# Patient Record
Sex: FEMALE | Race: WHITE | Employment: FULL TIME | URBAN - METROPOLITAN AREA
[De-identification: names, ages, dates, MRNs, and addresses within clinical notes are randomized per-mention and may not be internally consistent; named-entity substitution may affect disease eponyms.]

---

## 2010-06-08 LAB — PAP-ABSTRACT: NORMAL

## 2014-02-11 LAB — PAP-ABSTRACT: NORMAL

## 2017-04-13 ENCOUNTER — OFFICE VISIT (OUTPATIENT)
Dept: FAMILY MEDICINE | Facility: CLINIC | Age: 37
End: 2017-04-13
Payer: COMMERCIAL

## 2017-04-13 VITALS
WEIGHT: 126 LBS | HEIGHT: 64 IN | DIASTOLIC BLOOD PRESSURE: 82 MMHG | OXYGEN SATURATION: 98 % | SYSTOLIC BLOOD PRESSURE: 118 MMHG | TEMPERATURE: 98.5 F | BODY MASS INDEX: 21.51 KG/M2

## 2017-04-13 DIAGNOSIS — B35.4 TINEA CORPORIS: ICD-10-CM

## 2017-04-13 DIAGNOSIS — Z00.00 PREVENTATIVE HEALTH CARE: ICD-10-CM

## 2017-04-13 DIAGNOSIS — Z72.0 TOBACCO USE: ICD-10-CM

## 2017-04-13 DIAGNOSIS — K14.6 TONGUE BURNING SENSATION: ICD-10-CM

## 2017-04-13 DIAGNOSIS — D69.6 THROMBOCYTOPENIA, UNSPECIFIED (H): ICD-10-CM

## 2017-04-13 DIAGNOSIS — B37.0 THRUSH: Primary | ICD-10-CM

## 2017-04-13 PROBLEM — R87.610 PAPANICOLAOU SMEAR OF CERVIX WITH ATYPICAL SQUAMOUS CELLS OF UNDETERMINED SIGNIFICANCE (ASC-US): Status: ACTIVE | Noted: 2017-04-13

## 2017-04-13 LAB
KOH PREP SPEC: ABNORMAL
MICRO REPORT STATUS: ABNORMAL
SPECIMEN SOURCE: ABNORMAL

## 2017-04-13 PROCEDURE — 99204 OFFICE O/P NEW MOD 45 MIN: CPT | Performed by: PHYSICIAN ASSISTANT

## 2017-04-13 PROCEDURE — 87210 SMEAR WET MOUNT SALINE/INK: CPT | Performed by: PHYSICIAN ASSISTANT

## 2017-04-13 RX ORDER — NYSTATIN 100000/ML
500000 SUSPENSION, ORAL (FINAL DOSE FORM) ORAL 4 TIMES DAILY
Qty: 280 ML | Refills: 0 | Status: SHIPPED | OUTPATIENT
Start: 2017-04-13 | End: 2017-04-27

## 2017-04-13 RX ORDER — CLOTRIMAZOLE 1 %
CREAM (GRAM) TOPICAL 2 TIMES DAILY
Qty: 24 G | Refills: 1 | Status: SHIPPED | OUTPATIENT
Start: 2017-04-13 | End: 2017-08-15

## 2017-04-13 RX ORDER — CITALOPRAM HYDROBROMIDE 20 MG/1
20 TABLET ORAL DAILY
Refills: 11 | COMMUNITY
Start: 2017-03-14 | End: 2017-08-15

## 2017-04-13 NOTE — PROGRESS NOTES
Results discussed directly with patient while patient was present. Any further details documented in the note.   Sarah Ross PA-C

## 2017-04-13 NOTE — PROGRESS NOTES
"  SUBJECTIVE:                                                    Aye Lazo is a 36 year old female who presents to clinic today for the following health issues:    Tongue Concerns  Aye presents to clinic today with chief complaint of tongue concerns. Onset of symptoms was ~2 weeks. She describes tongue discoloration, discomfort, and \"burning\" sensation on tongue. Also notes some right ear discomfort. Denies recent antibiotic use though she has been suffering from viral cough and sinus symptoms. States that she has been pushing vitamin C to improve symptoms. Has tried cutting back vitamin C use to see if this was causing symptoms as symptoms are aggravated when taking vitamin C throat lozenge without any improvement of symptoms. She denies any other recent diet, medication, or supplement changes. She does have PMH significant for tobacco abuse. Currently smoking 0.5 packs per day though mildly decreased with current oral symptoms.     Problem list and histories reviewed & adjusted, as indicated.  Additional history: as documented    Patient Active Problem List   Diagnosis     Pre-eclampsia     Fetal growth restriction     S/P  section     Tobacco use     ASCUS Pap 2010     Thrombocytopenia, unspecified (H)     Past Surgical History:   Procedure Laterality Date      SECTION N/A 2014    Procedure:  SECTION;  Surgeon: Sobia Patel MD;  Location: [facility]     TYMPANOPLASTY, RT/LT Right        Social History   Substance Use Topics     Smoking status: Current Every Day Smoker     Packs/day: 0.50     Years: 19.00     Smokeless tobacco: Never Used     Alcohol use Yes      Comment: rare     History reviewed. No pertinent family history.      Current Outpatient Prescriptions   Medication Sig Dispense Refill     Cetirizine HCl (ZYRTEC ALLERGY PO)        clotrimazole (LOTRIMIN) 1 % cream Apply topically 2 times daily 24 g 1     nystatin (MYCOSTATIN) 325346 UNIT/ML " "suspension Take 5 mLs (500,000 Units) by mouth 4 times daily for 14 days 280 mL 0     citalopram (CELEXA) 20 MG tablet Take 20 mg by mouth daily  11     Allergies   Allergen Reactions     Azithromycin        Reviewed and updated as needed this visit by clinical staff  Tobacco  Allergies  Meds  Problems  Med Hx  Surg Hx  Fam Hx  Soc Hx        Reviewed and updated as needed this visit by Provider  Tobacco  Allergies  Meds  Problems  Med Hx  Surg Hx  Fam Hx  Soc Hx          ROS:  Constitutional, HEENT, cardiovascular, pulmonary, GI, , musculoskeletal, neuro, skin, endocrine and psych systems are negative, except as otherwise noted.    This document serves as a record of the services and decisions personally performed and made by Sarah Ross PA-C. It was created on her behalf by Galina Ambrose, a trained medical scribe. The creation of this document is based the provider's statements to the medical scribe.  Galina Ambrose, April 13, 2017 11:28 AM    OBJECTIVE:                                                    /82  Temp 98.5  F (36.9  C) (Tympanic)  Ht 5' 4\" (1.626 m)  Wt 126 lb (57.2 kg)  SpO2 98%  BMI 21.63 kg/m2  Body mass index is 21.63 kg/(m^2).     GENERAL: healthy, alert and no distress  HENT: right TM tense, scarred, and with clear effusion though w/o evidence of infection, left canal clear, nose without ulcers or lesions,   NECK: no adenopathy, no asymmetry, masses, or scars and thyroid normal to palpation  RESP: lungs clear to auscultation - no rales, rhonchi or wheezes  CV: regular rate and rhythm, normal S1 S2, no S3 or S4, no murmur, click or rub, no peripheral edema and peripheral pulses strong  SKIN: multiple scatter erythematous tinea with raised borders on lateral aspect of left shin and right forearm  NEURO: Normal strength and tone, mentation intact and speech normal  PSYCH: mentation appears normal, affect normal/bright    Diagnostic Test Results:  Results for orders placed " or performed in visit on 04/13/17 (from the past 24 hour(s))   KOH prep (Other than skin, nails, hair)   Result Value Ref Range    Specimen Description Tongue     KOH Prep Fungal elements seen (A)     Micro Report Status FINAL 04/13/2017         ASSESSMENT/PLAN:                                                    Aye was seen today for other.    Diagnoses and all orders for this visit:    Thrush, Tongue burning sensation - if no improvement may require workup for immunocompromised status  Begin taking oral nystatin as prescribed to improve symptoms.   -     nystatin (MYCOSTATIN) 714136 UNIT/ML suspension; Take 5 mLs (500,000 Units) by mouth 4 times daily for 14 days  -     KOH prep (Other than skin, nails, hair): positive fungal mouth     Tinea corporis  Begin using topical clotrimazole as prescribed over rash to improve symptoms.  -     clotrimazole (LOTRIMIN) 1 % cream; Apply topically 2 times daily    Tobacco use  Counseled patient on the importance of tobacco cessation. Also recommended patient RTC for a physical for preventative health and wellness.  ASCUS Pap in 2010.    The information in this document, created by the medical scribe for me, accurately reflects the services I personally performed and the decisions made by me. I have reviewed and approved this document for accuracy prior to leaving the patient care area .  Sarah Ross PA-C April 13, 2017 11:28 AM    Sarah Ross PA-C  [facility]

## 2017-04-13 NOTE — NURSING NOTE
"Chief Complaint   Patient presents with     Other     tounge issue, spots on tounge ~2 weeks        Initial /82  Temp 98.5  F (36.9  C) (Tympanic)  Ht 5' 4\" (1.626 m)  Wt 126 lb (57.2 kg)  SpO2 98%  BMI 21.63 kg/m2 Estimated body mass index is 21.63 kg/(m^2) as calculated from the following:    Height as of this encounter: 5' 4\" (1.626 m).    Weight as of this encounter: 126 lb (57.2 kg).  Medication Reconciliation: complete   Sobia Troy, DAVID      "

## 2017-04-13 NOTE — PATIENT INSTRUCTIONS
.tcavstine  Fungal Skin Infection (Tinea)  A fungal infection is when too much fungus grows on or in the body. Fungus normally lives on the skin in small amounts and does not cause harm. But when too much grows on the skin, it causes an infection. This is also known as tinea. Fungal skin infections are common and not often serious.  The infection often starts as a small red area the size of a pea. The skin may turn dry and flaky. The area may itch. As the fungus grows, it spreads out in a red [place]. Because of how it looks, fungal skin infection is often called ringworm, but it is not caused by a worm. Fungal skin infections can occur on many parts of the body. They can grow on the head, chest, arms, or legs. They can occur on the buttocks. On the feet, fungal infection is known as  athlete s foot.  It causes itchy, sometimes painful sores between the toes and the bottom or sides of the feet. In the groin, the rash is called  jock itch.   People with weakened immune systems can get a fungal infection more easily. This includes people with diabetes or HIV, or who are being treated for cancer. In these cases, the fungal infection can spread and cause severe illness. Fungal infections are also more common in people who are obese.  In most cases, treatment is done with antifungal cream or ointment. If the infection is on your scalp, you may take oral medication. In some cases, a tiny piece of the skin (biopsy) may be taken. This is so it can be tested in a lab.  Common fungal infections are treated with creams on the skin or oral medicine.  Home care  Follow all instructions when using antifungal cream or ointment on your skin. The health care provider may advise using cornstarch powder to keep your skin dry or petroleum jelly to provide a barrier.  General care:    If you were prescribed an oral medicine, read the patient information. Talk with the health care provider about the risks and side effects.    Let your  skin dry completely after bathing. Carefully dry your feet and between your toes.    Dress in loose cotton clothing.    Don t scratch the affected area. This can delay healing and may spread the infection. It can also cause a bacterial infection.    Keep your skin clean, but don t wash the skin too much. This can irritate your skin.    Keep in mind that it may take a week before the fungus starts to go away. It can take 2 to 4 weeks to fully clear. To prevent it from coming back, use the medicine until the rash is all gone.  Follow-up care  Follow up with your health care provider if the rash does not get better after 10 days of treatment. Also follow up if the rash spreads to other parts of your body.  When to seek medical advice  Call your health care provider right away if any of these occur:    Fever of 100.4 F (38 C) or higher    Redness or swelling that gets worse    Pain that gets worse    Foul-smelling fluid leaking from the skin       5674-6135 [employer]. [address] [place] [address], [place] [address]. All rights reserved. This information is not intended as a substitute for professional medical care. Always follow your healthcare professional's instructions.        Candida Infection: Thrush  Thrush is a type of fungal infection in the mouth and throat. Thrush does not usually affect healthy adults. It is more common in people with a weakened immune system. It is also more likely if you take antibiotics. Thrush is normally not contagious.  Understanding fungus in the mouth and throat  Your mouth and throat normally contain millions of tiny organisms. These include bacteria and yeasts. Many of these do not cause any problems. In fact, they may help fight disease.  Yeasts are a type of fungus. A type of yeast called Candida normally lives on your skin. It is also found on the membranes of your mouth and throat. Usually, this yeast grows only in small amounts and is harmless. But in some cases,  Candida can grow out of control and cause thrush. Thrush is related to other kinds of Candida infections that can grow all over the body. Thrush refers to an infection of only the mouth and throat.  What causes thrush?  Thrush happens when something lets too much Candida grow inside your mouth and throat. Certain things that change the normal balance of organisms in the mouth can lead to thrush. One example is antibiotic medicine. This medicine may kill some of the normal bacteria in your mouth. Candida can then grow freely. People on antibiotics have an increased risk for thrush.  You have a higher risk for thrush if you:    Wear dentures    Are getting chemotherapy    Are getting radiation therapy    Have diabetes    Have a transplanted organ    Use corticosteroids    Have a weakened immune system, such as from AIDS    Are an older adult  Symptoms of thrush  Some people with thrush do not have any symptoms. Symptoms of thrush can include:    A dry, cottony feeling in your mouth    Loss of taste    Pain while eating or swallowing    White or red patches inside your mouth or on the back of your throat  Diagnosing thrush  Your health care provider will ask about your medical history and your symptoms. He or she will look closely at your mouth and throat. White or red patches will be scraped with a tongue depressor. The sample will be sent to a lab to test. This test can usually confirm thrush.  If you have thrush, you may also have esophageal candidiasis. This is common in people who have HIV. Your health care provider may check for this condition with an upper endoscopy. This is a procedure to look at the esophagus. A tissue sample may be taken to test.  Treatment for thrush  It is important to treat thrush early. Untreated, it may turn into a more serious form of widespread infection. Thrush is usually treated with antifungal medicine. The medicine is put directly in your mouth and throat. You may be given a  swish  and swallow  medicine or an antifungal lozenge.  In some cases, you may need an antifungal pill. This can remove Candida throughout your body. Or you may need medicine through an IV. These treatments depend on how severe your infection is, and what other health conditions you have.  If you are at high risk for thrush, you may need to keep taking oral antifungal medicine. This is to help prevent thrush in the future.  What happens if you don t get treated for thrush?  If untreated, the Candida may spread throughout your body. They may even enter your bloodstream. This can cause serious problems, such as organ failure and even death. Bloodstream infection may need to be treated with high doses of antifungal medicine through an IV.  Systemic infection is much more likely in people who are very ill. It is also more common in those who have serious problems with their immune system. Additional risk factors for systemic infection in very ill people include:    Central venous lines    IV nutrition    Broad-spectrum antibiotics    Kidney failure    Recent surgery  Preventing thrush  You may be able to help prevent some cases of thrush. Make sure to:    Practice good oral hygiene. Try using a chlorhexidine mouthwash.    Clean your dentures regularly as instructed. Make sure they fit you correctly.    After using a corticosteroid inhaler, rinse out your mouth with water or mouthwash.    Do not use broad-spectrum antibiotics, if possible.    Get treated for health problems that increase your risk for thrush, such as diabetes.     When to call the health care provider  Call your health care provider right away if you have any of these:    Cottony feeling in your mouth    Loss of taste    Pain while eating or swallowing    White or red patches inside your mouth or on the back of your throat        4818-8636 [employer]. [address] [place] [address], [place] [address]. All rights reserved. This information is not intended  as a substitute for professional medical care. Always follow your healthcare professional's instructions.

## 2017-04-13 NOTE — MR AVS SNAPSHOT
After Visit Summary   4/13/2017    Aye Lazo    MRN: [id number]           Patient Information     Date Of Birth          1980        Visit Information        Provider Department      4/13/2017 11:00 AM Sarah Ross PA-C [facility] [place]        Today's Diagnoses     Thrush    -  1    Tongue burning sensation        Tinea corporis        Tobacco use          Care Instructions    .tcavstine  Fungal Skin Infection (Tinea)  A fungal infection is when too much fungus grows on or in the body. Fungus normally lives on the skin in small amounts and does not cause harm. But when too much grows on the skin, it causes an infection. This is also known as tinea. Fungal skin infections are common and not often serious.  The infection often starts as a small red area the size of a pea. The skin may turn dry and flaky. The area may itch. As the fungus grows, it spreads out in a red [place]. Because of how it looks, fungal skin infection is often called ringworm, but it is not caused by a worm. Fungal skin infections can occur on many parts of the body. They can grow on the head, chest, arms, or legs. They can occur on the buttocks. On the feet, fungal infection is known as  athlete s foot.  It causes itchy, sometimes painful sores between the toes and the bottom or sides of the feet. In the groin, the rash is called  jock itch.   People with weakened immune systems can get a fungal infection more easily. This includes people with diabetes or HIV, or who are being treated for cancer. In these cases, the fungal infection can spread and cause severe illness. Fungal infections are also more common in people who are obese.  In most cases, treatment is done with antifungal cream or ointment. If the infection is on your scalp, you may take oral medication. In some cases, a tiny piece of the skin (biopsy) may be taken. This is so it can be tested in a lab.  Common fungal infections are treated with  creams on the skin or oral medicine.  Home care  Follow all instructions when using antifungal cream or ointment on your skin. The health care provider may advise using cornstarch powder to keep your skin dry or petroleum jelly to provide a barrier.  General care:    If you were prescribed an oral medicine, read the patient information. Talk with the health care provider about the risks and side effects.    Let your skin dry completely after bathing. Carefully dry your feet and between your toes.    Dress in loose cotton clothing.    Don t scratch the affected area. This can delay healing and may spread the infection. It can also cause a bacterial infection.    Keep your skin clean, but don t wash the skin too much. This can irritate your skin.    Keep in mind that it may take a week before the fungus starts to go away. It can take 2 to 4 weeks to fully clear. To prevent it from coming back, use the medicine until the rash is all gone.  Follow-up care  Follow up with your health care provider if the rash does not get better after 10 days of treatment. Also follow up if the rash spreads to other parts of your body.  When to seek medical advice  Call your health care provider right away if any of these occur:    Fever of 100.4 F (38 C) or higher    Redness or swelling that gets worse    Pain that gets worse    Foul-smelling fluid leaking from the skin       8266-8588 [employer]. [address] [place] [address], [place] [id number]. All rights reserved. This information is not intended as a substitute for professional medical care. Always follow your healthcare professional's instructions.        Candida Infection: Thrush  Thrush is a type of fungal infection in the mouth and throat. Thrush does not usually affect healthy adults. It is more common in people with a weakened immune system. It is also more likely if you take antibiotics. Thrush is normally not contagious.  Understanding fungus in the mouth and  throat  Your mouth and throat normally contain millions of tiny organisms. These include bacteria and yeasts. Many of these do not cause any problems. In fact, they may help fight disease.  Yeasts are a type of fungus. A type of yeast called Candida normally lives on your skin. It is also found on the membranes of your mouth and throat. Usually, this yeast grows only in small amounts and is harmless. But in some cases, Candida can grow out of control and cause thrush. Thrush is related to other kinds of Candida infections that can grow all over the body. Thrush refers to an infection of only the mouth and throat.  What causes thrush?  Thrush happens when something lets too much Candida grow inside your mouth and throat. Certain things that change the normal balance of organisms in the mouth can lead to thrush. One example is antibiotic medicine. This medicine may kill some of the normal bacteria in your mouth. Candida can then grow freely. People on antibiotics have an increased risk for thrush.  You have a higher risk for thrush if you:    Wear dentures    Are getting chemotherapy    Are getting radiation therapy    Have diabetes    Have a transplanted organ    Use corticosteroids    Have a weakened immune system, such as from AIDS    Are an older adult  Symptoms of thrush  Some people with thrush do not have any symptoms. Symptoms of thrush can include:    A dry, cottony feeling in your mouth    Loss of taste    Pain while eating or swallowing    White or red patches inside your mouth or on the back of your throat  Diagnosing thrush  Your health care provider will ask about your medical history and your symptoms. He or she will look closely at your mouth and throat. White or red patches will be scraped with a tongue depressor. The sample will be sent to a lab to test. This test can usually confirm thrush.  If you have thrush, you may also have esophageal candidiasis. This is common in people who have HIV. Your  health care provider may check for this condition with an upper endoscopy. This is a procedure to look at the esophagus. A tissue sample may be taken to test.  Treatment for thrush  It is important to treat thrush early. Untreated, it may turn into a more serious form of widespread infection. Thrush is usually treated with antifungal medicine. The medicine is put directly in your mouth and throat. You may be given a  swish and swallow  medicine or an antifungal lozenge.  In some cases, you may need an antifungal pill. This can remove Candida throughout your body. Or you may need medicine through an IV. These treatments depend on how severe your infection is, and what other health conditions you have.  If you are at high risk for thrush, you may need to keep taking oral antifungal medicine. This is to help prevent thrush in the future.  What happens if you don t get treated for thrush?  If untreated, the Candida may spread throughout your body. They may even enter your bloodstream. This can cause serious problems, such as organ failure and even death. Bloodstream infection may need to be treated with high doses of antifungal medicine through an IV.  Systemic infection is much more likely in people who are very ill. It is also more common in those who have serious problems with their immune system. Additional risk factors for systemic infection in very ill people include:    Central venous lines    IV nutrition    Broad-spectrum antibiotics    Kidney failure    Recent surgery  Preventing thrush  You may be able to help prevent some cases of thrush. Make sure to:    Practice good oral hygiene. Try using a chlorhexidine mouthwash.    Clean your dentures regularly as instructed. Make sure they fit you correctly.    After using a corticosteroid inhaler, rinse out your mouth with water or mouthwash.    Do not use broad-spectrum antibiotics, if possible.    Get treated for health problems that increase your risk for thrush,  "such as diabetes.     When to call the health care provider  Call your health care provider right away if you have any of these:    Cottony feeling in your mouth    Loss of taste    Pain while eating or swallowing    White or red patches inside your mouth or on the back of your throat        1743-3222 [employer]. [address] [place] [address]. All rights reserved. This information is not intended as a substitute for professional medical care. Always follow your healthcare professional's instructions.              Follow-ups after your visit        Who to contact     If you have questions or need follow up information about today's clinic visit or your schedule please contact [facility] directly at [phone].  Normal or non-critical lab and imaging results will be communicated to you by MyChart, letter or phone within 4 business days after the clinic has received the results. If you do not hear from us within 7 days, please contact the clinic through [employer] or phone. If you have a critical or abnormal lab result, we will notify you by phone as soon as possible.  Submit refill requests through [employer] or call your pharmacy and they will forward the refill request to us. Please allow 3 business days for your refill to be completed.          Additional Information About Your Visit        ProtoExchangehart Information     clypd lets you send messages to your doctor, view your test results, renew your prescriptions, schedule appointments and more. To sign up, go to [email or web address] . Click on \"Log in\" on the left side of the screen, which will take you to the Welcome page. Then click on \"Sign up Now\" on the right side of the page.     You will be asked to enter the access code listed below, as well as some personal information. Please follow the directions to create your username and password.     Your access code is: [email or web address]  Expires: 7/12/2017 11:53 AM     Your access " "code will  in 90 days. If you need help or a new code, please call your [place] clinic or [phone].        Care EveryWhere ID     This is your Care EveryWhere ID. This could be used by other organizations to access your [place] medical records  [id number]        Your Vitals Were     Temperature Height Pulse Oximetry BMI (Body Mass Index)          98.5  F (36.9  C) (Tympanic) 5' 4\" (1.626 m) 98% 21.63 kg/m2         Blood Pressure from Last 3 Encounters:   17 118/82   14 129/82    Weight from Last 3 Encounters:   17 126 lb (57.2 kg)   14 158 lb 14.4 oz (72.1 kg)              We Performed the Following     KOH prep (Other than skin, nails, hair)          Today's Medication Changes          These changes are accurate as of: 17 11:53 AM.  If you have any questions, ask your nurse or doctor.               Start taking these medicines.        Dose/Directions    clotrimazole 1 % cream   Commonly known as:  LOTRIMIN   Used for:  Tinea corporis   Started by:  Sarah Ross PA-C        Apply topically 2 times daily   Quantity:  24 g   Refills:  1       nystatin 012137 UNIT/ML suspension   Commonly known as:  MYCOSTATIN   Used for:  Thrush   Started by:  Sarah Ross PA-C        Dose:  023515 Units   Take 5 mLs (500,000 Units) by mouth 4 times daily for 14 days   Quantity:  280 mL   Refills:  0            Where to get your medicines      These medications were sent to [facility] [place] - [address] [address]     Phone:  [phone]     clotrimazole 1 % cream    nystatin 307451 UNIT/ML suspension                Primary Care Provider Office Phone # Fax #    Rebecca SAMSON Miraphillip [phone]       [place] [address] [address] [facility] [address]        Thank you!     Thank you for choosing [facility]  for your care. Our goal is always to provide you with " excellent care. Hearing back from our patients is one way we can continue to improve our services. Please take a few minutes to complete the written survey that you may receive in the mail after your visit with us. Thank you!             Your Updated Medication List - Protect others around you: Learn how to safely use, store and throw away your medicines at www.disposemymeds.org.          This list is accurate as of: 4/13/17 11:53 AM.  Always use your most recent med list.                   Brand Name Dispense Instructions for use    citalopram 20 MG tablet    celeXA     Take 20 mg by mouth daily       clotrimazole 1 % cream    LOTRIMIN    24 g    Apply topically 2 times daily       nystatin 514138 UNIT/ML suspension    MYCOSTATIN    280 mL    Take 5 mLs (500,000 Units) by mouth 4 times daily for 14 days       ZYRTEC ALLERGY PO

## 2017-08-14 NOTE — TELEPHONE ENCOUNTER
Reason for Call:  Other prescription refill    Detailed comments: Patient calling for refill of Celexa.    Phone Number Patient can be reached at:     [phone]    Best Time: any    Can we leave a detailed message on this number? YES    Call taken on 8/14/2017 at 9:16 AM by Elen Israel

## 2017-08-15 ENCOUNTER — NURSE TRIAGE (OUTPATIENT)
Dept: NURSING | Facility: CLINIC | Age: 37
End: 2017-08-15

## 2017-08-15 ENCOUNTER — OFFICE VISIT (OUTPATIENT)
Dept: FAMILY MEDICINE | Facility: CLINIC | Age: 37
End: 2017-08-15
Payer: COMMERCIAL

## 2017-08-15 VITALS
DIASTOLIC BLOOD PRESSURE: 77 MMHG | HEART RATE: 100 BPM | HEIGHT: 64 IN | OXYGEN SATURATION: 98 % | WEIGHT: 117 LBS | SYSTOLIC BLOOD PRESSURE: 113 MMHG | TEMPERATURE: 98.1 F | BODY MASS INDEX: 19.97 KG/M2

## 2017-08-15 DIAGNOSIS — L02.91 ABSCESS: Primary | ICD-10-CM

## 2017-08-15 DIAGNOSIS — F32.0 MILD MAJOR DEPRESSION (H): ICD-10-CM

## 2017-08-15 DIAGNOSIS — Z72.0 TOBACCO USE: ICD-10-CM

## 2017-08-15 PROCEDURE — 87077 CULTURE AEROBIC IDENTIFY: CPT | Performed by: PHYSICIAN ASSISTANT

## 2017-08-15 PROCEDURE — 99214 OFFICE O/P EST MOD 30 MIN: CPT | Performed by: PHYSICIAN ASSISTANT

## 2017-08-15 PROCEDURE — 87070 CULTURE OTHR SPECIMN AEROBIC: CPT | Performed by: PHYSICIAN ASSISTANT

## 2017-08-15 RX ORDER — SULFAMETHOXAZOLE/TRIMETHOPRIM 800-160 MG
1 TABLET ORAL 2 TIMES DAILY
Qty: 20 TABLET | Refills: 0 | Status: SHIPPED | OUTPATIENT
Start: 2017-08-15 | End: 2017-09-19

## 2017-08-15 RX ORDER — CITALOPRAM HYDROBROMIDE 20 MG/1
20 TABLET ORAL DAILY
Qty: 60 TABLET | Refills: 11 | Status: SHIPPED | OUTPATIENT
Start: 2017-08-15 | End: 2017-08-15

## 2017-08-15 RX ORDER — CITALOPRAM HYDROBROMIDE 20 MG/1
20 TABLET ORAL DAILY
Qty: 90 TABLET | Refills: 1 | Status: SHIPPED | OUTPATIENT
Start: 2017-08-15

## 2017-08-15 ASSESSMENT — ANXIETY QUESTIONNAIRES
5. BEING SO RESTLESS THAT IT IS HARD TO SIT STILL: NOT AT ALL
3. WORRYING TOO MUCH ABOUT DIFFERENT THINGS: NOT AT ALL
IF YOU CHECKED OFF ANY PROBLEMS ON THIS QUESTIONNAIRE, HOW DIFFICULT HAVE THESE PROBLEMS MADE IT FOR YOU TO DO YOUR WORK, TAKE CARE OF THINGS AT HOME, OR GET ALONG WITH OTHER PEOPLE: NOT DIFFICULT AT ALL
2. NOT BEING ABLE TO STOP OR CONTROL WORRYING: NOT AT ALL
GAD7 TOTAL SCORE: 0
1. FEELING NERVOUS, ANXIOUS, OR ON EDGE: NOT AT ALL
6. BECOMING EASILY ANNOYED OR IRRITABLE: NOT AT ALL
7. FEELING AFRAID AS IF SOMETHING AWFUL MIGHT HAPPEN: NOT AT ALL

## 2017-08-15 ASSESSMENT — PATIENT HEALTH QUESTIONNAIRE - PHQ9
SUM OF ALL RESPONSES TO PHQ QUESTIONS 1-9: 0
5. POOR APPETITE OR OVEREATING: NOT AT ALL

## 2017-08-15 NOTE — NURSING NOTE
"Chief Complaint   Patient presents with     Infection     Bilateral hands - pus - drainge - discomfort - sensitive to the touch - red - swollen - Denies bleeding      Establish Care       Initial /77 (BP Location: Right arm, Patient Position: Chair, Cuff Size: Adult Regular)  Pulse 100  Temp 98.1  F (36.7  C) (Tympanic)  Ht 5' 4\" (1.626 m)  Wt 117 lb (53.1 kg)  LMP 07/05/2017 (Approximate)  SpO2 98%  Breastfeeding? No  BMI 20.08 kg/m2 Estimated body mass index is 20.08 kg/(m^2) as calculated from the following:    Height as of this encounter: 5' 4\" (1.626 m).    Weight as of this encounter: 117 lb (53.1 kg).  Medication Reconciliation: complete     Laverne Guajardo MA     "

## 2017-08-15 NOTE — TELEPHONE ENCOUNTER
citalopram (CELEXA) 20 MG tablet     This is a duplicate refill request.  Please decline and close.

## 2017-08-15 NOTE — PATIENT INSTRUCTIONS
* ABSCESS [Antibiotic treatment only]  An abscess (sometimes called a  boil ) occurs when bacteria get trapped under the skin and begin to grow. Pus forms inside the abscess as the body responds to the bacteria. An abscess can occur with an insect bite, ingrown hair, blocked oil gland, pimple, cyst, or puncture wound.  In the early stages, redness and tenderness are the only symptoms. Sometimes, this stage can be treated with antibiotics alone. If the abscess does not respond to antibiotic treatment, it will need to be drained with a small cut, under local anesthesia.  HOME CARE:    Soak the wound in hot water or apply hot packs (small towel soaked in hot water) to the area for 20 minutes at a time. Do this three to four times a day.    Apply antibiotic cream or ointment such as Bacitracin or Polysporin onto the skin 3-4 times a day, unless something else was prescribed.  Neosporin Plus  includes an antibiotic plus a local pain reliever.    Take all of the antibiotics until they are gone.    You may use acetaminophen (Tylenol) or ibuprofen (Motrin, Advil) to control pain, unless another pain medicine was prescribed. [ NOTE : If you have chronic liver or kidney disease or ever had a stomach ulcer or GI bleeding, talk with your doctor before using these any of these.]  FOLLOW UP as advised by our staff. Look at your wound each day for the signs of worsening infection listed below.  GET PROMPT MEDICAL ATTENTION if any of the following occur:    An increase in redness or swelling    Red streaks in the skin leading away from the abscess    An increase in local pain or swelling    Fever of 100.4 F (38 C) or higher, or as directed by your healthcare provider    Pus or fluid coming from the abscess    1249-6963 [employer], [address] [place] [address], [place] [address]. All rights reserved. This information is not intended as a substitute for professional medical care. Always follow your healthcare professional's  instructions.

## 2017-08-15 NOTE — MR AVS SNAPSHOT
After Visit Summary   8/15/2017    Aye Lazo    MRN: [id number]           Patient Information     Date Of Birth          1980        Visit Information        Provider Department      8/15/2017 9:00 AM Sarah Ross PA-C [facility] [place]        Today's Diagnoses     Mild major depression (H)    -  1    Abscess        Tobacco use          Care Instructions      * ABSCESS [Antibiotic treatment only]  An abscess (sometimes called a  boil ) occurs when bacteria get trapped under the skin and begin to grow. Pus forms inside the abscess as the body responds to the bacteria. An abscess can occur with an insect bite, ingrown hair, blocked oil gland, pimple, cyst, or puncture wound.  In the early stages, redness and tenderness are the only symptoms. Sometimes, this stage can be treated with antibiotics alone. If the abscess does not respond to antibiotic treatment, it will need to be drained with a small cut, under local anesthesia.  HOME CARE:    Soak the wound in hot water or apply hot packs (small towel soaked in hot water) to the area for 20 minutes at a time. Do this three to four times a day.    Apply antibiotic cream or ointment such as Bacitracin or Polysporin onto the skin 3-4 times a day, unless something else was prescribed.  Neosporin Plus  includes an antibiotic plus a local pain reliever.    Take all of the antibiotics until they are gone.    You may use acetaminophen (Tylenol) or ibuprofen (Motrin, Advil) to control pain, unless another pain medicine was prescribed. [ NOTE : If you have chronic liver or kidney disease or ever had a stomach ulcer or GI bleeding, talk with your doctor before using these any of these.]  FOLLOW UP as advised by our staff. Look at your wound each day for the signs of worsening infection listed below.  GET PROMPT MEDICAL ATTENTION if any of the following occur:    An increase in redness or swelling    Red streaks in the skin leading away  "from the abscess    An increase in local pain or swelling    Fever of 100.4 F (38 C) or higher, or as directed by your healthcare provider    Pus or fluid coming from the abscess    4677-0864 [employer] [facility], [address] [place] [address], [place] [address]. All rights reserved. This information is not intended as a substitute for professional medical care. Always follow your healthcare professional's instructions.            Follow-ups after your visit        Who to contact     If you have questions or need follow up information about today's clinic visit or your schedule please contact [facility] directly at [phone].  Normal or non-critical lab and imaging results will be communicated to you by DeliveryEdgehart, letter or phone within 4 business days after the clinic has received the results. If you do not hear from us within 7 days, please contact the clinic through [employer]Edgehart or phone. If you have a critical or abnormal lab result, we will notify you by phone as soon as possible.  Submit refill requests through [employer] or call your pharmacy and they will forward the refill request to us. Please allow 3 business days for your refill to be completed.          Additional Information About Your Visit        MyChart Information     [employer] lets you send messages to your doctor, view your test results, renew your prescriptions, schedule appointments and more. To sign up, go to [email or web address] . Click on \"Log in\" on the left side of the screen, which will take you to the Welcome page. Then click on \"Sign up Now\" on the right side of the page.     You will be asked to enter the access code listed below, as well as some personal information. Please follow the directions to create your username and password.     Your access code is: [email or web address]  Expires: 2017  9:37 AM     Your access code will  in 90 days. If you need help or a new code, please call your [facility] or [phone].        Care " "EveryWhere ID     This is your Care EveryWhere ID. This could be used by other organizations to access your [place] medical records  [id number]        Your Vitals Were     Pulse Temperature Height Last Period Pulse Oximetry Breastfeeding?    100 98.1  F (36.7  C) (Tympanic) 5' 4\" (1.626 m) 07/05/2017 (Approximate) 98% No    BMI (Body Mass Index)                   20.08 kg/m2            Blood Pressure from Last 3 Encounters:   08/15/17 113/77   04/13/17 118/82   12/22/14 129/82    Weight from Last 3 Encounters:   08/15/17 117 lb (53.1 kg)   04/13/17 126 lb (57.2 kg)   12/22/14 158 lb 14.4 oz (72.1 kg)              We Performed the Following     Wound Culture Aerobic Bacterial          Today's Medication Changes          These changes are accurate as of: 8/15/17  9:37 AM.  If you have any questions, ask your nurse or doctor.               Start taking these medicines.        Dose/Directions    citalopram 20 MG tablet   Commonly known as:  celeXA   Used for:  Mild major depression (H)   Started by:  Sarah Ross PA-C        Dose:  20 mg   Take 1 tablet (20 mg) by mouth daily   Quantity:  90 tablet   Refills:  1       sulfamethoxazole-trimethoprim 800-160 MG per tablet   Commonly known as:  BACTRIM DS   Used for:  Abscess   Started by:  Sarah Ross PA-C        Dose:  1 tablet   Take 1 tablet by mouth 2 times daily for 10 days   Quantity:  20 tablet   Refills:  0            Where to get your medicines      These medications were sent to [facility] [address] [address]     Phone:  [phone]     citalopram 20 MG tablet    sulfamethoxazole-trimethoprim 800-160 MG per tablet                Primary Care Provider Office Phone # Fax #    Rebecca SAMSON Miraphillip [phone]       [place] [address] [address] [facility] [address]        Equal Access to Services     AMANDA WEBSTER AH: Santoii alessio renee " deric Polanco, wakatiuskada lumarciaadaha, qaeribertota kaavada ignacia, leonard espitia keshagladys edmundosmith lavineetclarisa hardy. So [facility] [phone].    ATENCIÓN: Si habla español, tiene a prakash disposición servicios gratuitos de asistencia lingüística. Sky al [phone].    We comply with applicable federal civil rights laws and [place] laws. We do not discriminate on the basis of race, color, national origin, age, disability sex, sexual orientation or gender identity.            Thank you!     Thank you for choosing [facility]  for your care. Our goal is always to provide you with excellent care. Hearing back from our patients is one way we can continue to improve our services. Please take a few minutes to complete the written survey that you may receive in the mail after your visit with us. Thank you!             Your Updated Medication List - Protect others around you: Learn how to safely use, store and throw away your medicines at www.disposemymeds.org.          This list is accurate as of: 8/15/17  9:37 AM.  Always use your most recent med list.                   Brand Name Dispense Instructions for use Diagnosis    citalopram 20 MG tablet    celeXA    90 tablet    Take 1 tablet (20 mg) by mouth daily    Mild major depression (H)       sulfamethoxazole-trimethoprim 800-160 MG per tablet    BACTRIM DS    20 tablet    Take 1 tablet by mouth 2 times daily for 10 days    Abscess       ZYRTEC ALLERGY PO

## 2017-08-15 NOTE — PROGRESS NOTES
SUBJECTIVE:                                                    Aye Lazo is a 36 year old female who presents to clinic today for the following health issues:    Hand lesions  Aye presents to clinic today for evaluation of several lesions on bilateral hands. 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 There are several lesions on the dorsal aspect of her hands. She states she has drained one lesion and would like all of abscesses drained today. There is discomfort and pain with redness and swelling but no bleeding. She has been soaking her hands in salt water with no relief. No known insect bites. No recent antibiotic use. No fevers, chills, or night sweats.     Depression  She has a history of depression that is currently managed on citalopram 20 mg daily. She requests a refill today. She has been on this medication since she was 17.     Right ear concerns  She is status post right ear reconstruction but states it has been popping lately and would like it examined today.     Problem list and histories reviewed & adjusted, as indicated.  Additional history: as documented    Patient Active Problem List   Diagnosis     Pre-eclampsia     Fetal growth restriction     S/P  section     Tobacco use     ASCUS of cervix with negative high risk HPV     Thrombocytopenia, unspecified (H)     Past Surgical History:   Procedure Laterality Date      SECTION N/A 2014    Procedure:  SECTION;  Surgeon: Sobia Patel MD;  Location: [facility]     TYMPANOPLASTY, RT/LT Right        Social History   Substance Use Topics     Smoking status: Current Every Day Smoker     Packs/day: 0.50     Years: 19.00     Smokeless tobacco: Never Used     Alcohol use Yes      Comment: rare     History reviewed. No pertinent family history.      Current Outpatient Prescriptions   Medication Sig Dispense Refill     sulfamethoxazole-trimethoprim (BACTRIM DS)  "800-160 MG per tablet Take 1 tablet by mouth 2 times daily for 10 days 20 tablet 0     citalopram (CELEXA) 20 MG tablet Take 1 tablet (20 mg) by mouth daily 90 tablet 1     Cetirizine HCl (ZYRTEC ALLERGY PO)        [DISCONTINUED] citalopram (CELEXA) 20 MG tablet Take 1 tablet (20 mg) by mouth daily 60 tablet 11     [DISCONTINUED] citalopram (CELEXA) 20 MG tablet Take 20 mg by mouth daily  11     Allergies   Allergen Reactions     Azithromycin          Reviewed and updated as needed this visit by clinical staff  Tobacco  Allergies  Meds  Problems  Med Hx  Surg Hx  Fam Hx  Soc Hx        Reviewed and updated as needed this visit by Provider  Tobacco  Allergies  Meds  Problems  Med Hx  Surg Hx  Fam Hx  Soc Hx          ROS:  Constitutional, HEENT, cardiovascular, pulmonary, GI, , musculoskeletal, neuro, skin, endocrine and psych systems are negative, except as otherwise noted.    This document serves as a record of the services and decisions personally performed and made by Sarah Ross PA-C. It was created on her behalf by Claudia Guajardo, a trained medical scribe. The creation of this document is based the provider's statements to the medical scribe.  Scribe Claudia Guajardo 9:26 AM, August 15, 2017    OBJECTIVE:   /77 (BP Location: Right arm, Patient Position: Chair, Cuff Size: Adult Regular)  Pulse 100  Temp 98.1  F (36.7  C) (Tympanic)  Ht 5' 4\" (1.626 m)  Wt 117 lb (53.1 kg)  LMP 07/05/2017 (Approximate)  SpO2 98%  Breastfeeding? No  BMI 20.08 kg/m2  Body mass index is 20.08 kg/(m^2).  GENERAL: healthy, alert and no distress  HENT: Right ear canal normal, T s/p reconstruction with clear effusion and mild erythema. MS: Multiple pustular lesions on the dorsal aspect of right hand with active drainage overlying the MCP joint of the 3rd finger.     Diagnostic Test Results:  Pending wound culture.     ASSESSMENT/PLAN:   Aye was seen today for infection, establish care and patient " request.    Diagnoses and all orders for this visit:    Abscess  Appears to be consistent with bacterial infection on exam. Because I do have concerns for MRSA given her occupation as a , this will be treated with Bactrim. Advised to wear fingerless/workout gloves while working to avoid further contact with small mailboxes. She should otherwise leave these lesions open and not covered. I also recommended warm soaks with dish soap.  -     Wound Culture Aerobic Bacterial  -     sulfamethoxazole-trimethoprim (BACTRIM DS) 800-160 MG per tablet; Take 1 tablet by mouth 2 times daily for 10 days    Mild major depression (H)  Stable. Refill provided per patient request. Continue current medication without any change.   -     citalopram (CELEXA) 20 MG tablet; Take 1 tablet (20 mg) by mouth daily    Tobacco use  19 year smoking history of 0.5 ppd. Continues to smoke daily. Advised cessation.     The information in this document, created by the medical scribe for me, accurately reflects the services I personally performed and the decisions made by me. I have reviewed and approved this document for accuracy prior to leaving the patient care area.  9:26 AM, 08/15/17    Sarah Ross PA-C  [facility]

## 2017-08-16 ENCOUNTER — TELEPHONE (OUTPATIENT)
Dept: FAMILY MEDICINE | Facility: CLINIC | Age: 37
End: 2017-08-16

## 2017-08-16 RX ORDER — CITALOPRAM HYDROBROMIDE 20 MG/1
20 TABLET ORAL DAILY
Qty: 60 TABLET | Refills: 11 | OUTPATIENT
Start: 2017-08-16

## 2017-08-16 ASSESSMENT — ANXIETY QUESTIONNAIRES: GAD7 TOTAL SCORE: 0

## 2017-08-16 NOTE — TELEPHONE ENCOUNTER
Pt calling back. Having diarrhea. Leg or calf cramping when standing or walking. Tingly in calf area.     Pt has been sleeping sleeping so much due to experiences from yesterday. Pt is using warm soap and water and also the bactroban cream.    [phone].    Huddle with LP, advised this could just be flushing through their system and to keep pushing fluids.    The patient indicates understanding of these issues and agrees with the plan.  Nely Wallace RN

## 2017-08-16 NOTE — TELEPHONE ENCOUNTER
Reason for Disposition    [1] Weakness (i.e., paralysis, loss of muscle strength) of the face, arm / hand, or leg / foot on one side of the body AND [2] sudden onset AND [3] present now    Additional Information    Negative: Severe difficulty breathing (e.g., struggling for each breath, speaks in single words)    Negative: Shock suspected (e.g., cold/pale/clammy skin, too weak to stand, low BP, rapid pulse)    Negative: Difficult to awaken or acting confused  (e.g., disoriented, slurred speech)    Negative: [1] Fainted > 15 minutes ago AND [2] still feels too weak or dizzy to stand    Negative: [1] SEVERE weakness (i.e., unable to walk or barely able to walk, requires support) AND     [2] new onset or worsening    Negative: Sounds like a life-threatening emergency to the triager    Weakness of the face, arm or leg on one side of the body    Negative: [1] SEVERE weakness (i.e., unable to walk or barely able to walk, requires support) AND [2] new onset or worsening    Protocols used: NEUROLOGIC DEFICIT-ADULT-AH, WEAKNESS (GENERALIZED) AND FATIGUE-ADULT-AH   Aye calls and says that she was started today, on Bactrim DS, for all of the sores on her knuckles. Pt. Says that 1 hour after she took the Bactrim DS, she became so exhausted, dizzy, and confused. Pt. Says that she now feels very emotional and fatigued. Pt. Says that her hands hurt worse, too. Pt. Refuses to call 911. Pt. Says that she will have her  take her now, to an ER.

## 2017-08-16 NOTE — TELEPHONE ENCOUNTER
Patient was Rx'd with Sulfa yesterday for abscesses.   Within one hour of taking it she was extremely exhausted, confused, unable to make a sentence and her face, chest and sclera were red -she went to [facility] ED. Today she has diarrhea.    ED Dr said it was a medication reaction to Sulfa.    Gave her IV and benadryl. She is doing better today but she did talk on and on about the experience and I empathized with her. He Rx'd an antibiotic ointment 2% topical Bactroban -she has not filled this yet.  Her abscesses are looking better today.   LP-We are waiting for lab cultures.  Should she wait to fill Bactroban?  Also can you double check the banner- I put in the sulfa allergy but I would like you to double check and make sure it is in correctly.     Priscilla Little RN- Triage [employer]

## 2017-08-16 NOTE — TELEPHONE ENCOUNTER
Casandra Wallace contacted Aye on 08/16/17 and left a message. If patient calls back please contact RN team.  Nely Wallace RN

## 2017-08-17 LAB
BACTERIA SPEC CULT: ABNORMAL
Lab: ABNORMAL
SPECIMEN SOURCE: ABNORMAL

## 2017-08-22 ENCOUNTER — TELEPHONE (OUTPATIENT)
Dept: FAMILY MEDICINE | Facility: CLINIC | Age: 37
End: 2017-08-22
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
## 2017-09-19 DIAGNOSIS — Z53.9 ERRONEOUS ENCOUNTER--DISREGARD: Primary | ICD-10-CM

## 2017-09-19 RX ORDER — MUPIROCIN 20 MG/G
OINTMENT TOPICAL
COMMUNITY
Start: 2017-08-16 | End: 2019-03-16

## 2017-09-19 NOTE — TELEPHONE ENCOUNTER
Called and spoke with patient regarding her reaction to the sulfa medication.  In discussing her history I was aware of the modestly low platelet level in 2014 which is common in the postpartum period, we did not repeat a blood count that day as she did not appear toxic.  I chose bactrim because I was highly suspicious of MRSA due to the appearance of the lesions and I chose the oral regimen because the lesions were widespread on both hands.  The only other alternative for MRSA is very costly and she had never had a reaction to Bactrim in the past.      The patient voiced her frustration over the choice of this antibiotic as opposed to an oral regimen.  She did not allow me explain my rationale as mentioned above.      When she was seen in the ER her platelet levels were normal at 179 and her WBC was elevated at 12.4 indicative of a probable infection.    She was not aware of the above lab results and in reviewing [place] records from 2014 she has declined having the platelets evaluated as they informed her that it could cause more tests to be ordered.    I apologized for her experience as it sounded to be terribly frightening.  She then hung up on me.        Sarah Ross, MS, PA-C

## 2018-03-04 ENCOUNTER — HEALTH MAINTENANCE LETTER (OUTPATIENT)
Age: 38
End: 2018-03-04

## 2019-03-16 ENCOUNTER — ANCILLARY PROCEDURE (OUTPATIENT)
Dept: GENERAL RADIOLOGY | Facility: CLINIC | Age: 39
End: 2019-03-16
Attending: FAMILY MEDICINE
Payer: OTHER MISCELLANEOUS

## 2019-03-16 ENCOUNTER — OFFICE VISIT (OUTPATIENT)
Dept: FAMILY MEDICINE | Facility: CLINIC | Age: 39
End: 2019-03-16
Payer: OTHER MISCELLANEOUS

## 2019-03-16 ENCOUNTER — NURSE TRIAGE (OUTPATIENT)
Dept: NURSING | Facility: CLINIC | Age: 39
End: 2019-03-16

## 2019-03-16 VITALS
TEMPERATURE: 98 F | DIASTOLIC BLOOD PRESSURE: 82 MMHG | HEIGHT: 64 IN | WEIGHT: 122 LBS | BODY MASS INDEX: 20.83 KG/M2 | OXYGEN SATURATION: 100 % | SYSTOLIC BLOOD PRESSURE: 124 MMHG | HEART RATE: 98 BPM

## 2019-03-16 DIAGNOSIS — M54.2 NECK PAIN: Primary | ICD-10-CM

## 2019-03-16 DIAGNOSIS — M54.2 NECK PAIN: ICD-10-CM

## 2019-03-16 DIAGNOSIS — M54.6 ACUTE BILATERAL THORACIC BACK PAIN: ICD-10-CM

## 2019-03-16 PROCEDURE — 72040 X-RAY EXAM NECK SPINE 2-3 VW: CPT | Mod: FY

## 2019-03-16 PROCEDURE — 72070 X-RAY EXAM THORAC SPINE 2VWS: CPT | Mod: FY

## 2019-03-16 PROCEDURE — 99213 OFFICE O/P EST LOW 20 MIN: CPT | Performed by: FAMILY MEDICINE

## 2019-03-16 RX ORDER — IBUPROFEN 200 MG
600 TABLET ORAL EVERY 8 HOURS PRN
Qty: 30 TABLET | Refills: 1 | COMMUNITY
Start: 2019-03-16 | End: 2019-03-28

## 2019-03-16 RX ORDER — CYCLOBENZAPRINE HCL 5 MG
5-10 TABLET ORAL 3 TIMES DAILY PRN
Qty: 20 TABLET | Refills: 1 | Status: SHIPPED | OUTPATIENT
Start: 2019-03-16 | End: 2019-03-28 | Stop reason: ALTCHOICE

## 2019-03-16 ASSESSMENT — MIFFLIN-ST. JEOR: SCORE: 1213.39

## 2019-03-16 NOTE — PATIENT INSTRUCTIONS
Patient Education     Neck Clock (Flexibility)    1. Lie on your back on the floor, with your knees bent and your feet flat on the floor. Place a rolled-up towel or neck roll under your neck.  2. Close your eyes and imagine a clock face. With your nose, slowly trace the outer edge of the clock in a clockwise direction. Move your neck smoothly. Don t force your head or neck.  3. Repeat 5 times, or as instructed.  4. Then switch to a counterclockwise direction, and repeat the exercise 5 times, or as instructed.     Challenge yourself  You can also do this exercise while sitting at your work desk. Sit up straight with your back supported firmly against your chair. You can do the exercise several times throughout your day.   Date Last Reviewed: 3/10/2016    9381-4304 [employer]. [address] [place] [id number]. All rights reserved. This information is not intended as a substitute for professional medical care. Always follow your healthcare professional's instructions.

## 2019-03-16 NOTE — TELEPHONE ENCOUNTER
Patient reports she slipped on ice, is currently at work and neck is hurting.  Current pain is 7/10.  Patient requests appointment today and is seen at [facility].  FNA advised to call back with any concerns.  Call transferred to  per patient's request.        Additional Information    Negative: Dangerous mechanism of injury (e.g., MVA, contact sports, diving, fall on trampoline, fall > 10 feet or 3 meters) (Exception: neck pain began > 1 hour after injury)    Negative: Weakness of arms or legs    Negative: Numbness, tingling, or burning of arms, upper back/chest or legs (Exception: brief, now gone)    Negative: Major bleeding (e.g., actively dripping or spurting)    Negative: Bullet wound, knife wound, or other penetrating object    Negative: Difficulty breathing (e.g., choking or stridor)    Negative: Knocked out (unconscious from head injury) > 1 minute    Negative: [1] Direct blow to front of neck AND [2] cough, hoarseness, abnormal voice, or can't talk    Negative: Sounds like a life-threatening emergency to the triager    Negative: [1] Injuries at more than 1 site AND [2] unsure which guideline to use    Negative: Neck pain not from an injury    Negative: [1] Dangerous mechanism of injury (e.g., MVA, contact sports, diving,  fall on trampoline, fall > 10 feet or 3 meters) AND [2] neck pain or stiffness began > 1 hour after injury    Negative: [1] Numbness, tingling, or burning of arms, upper back/chest or legs AND [2] not present now (i.e., completely resolved)    Negative: Coughing up blood    Negative: Difficulty swallowing or drooling    Negative: Skin is split open or gaping  (or length > 1/2 inch or 12 mm)    Negative: Puncture wound of neck    Negative: [1] Bleeding AND [2] won't stop after 10 minutes of direct pressure (using correct technique)    Negative: Large swelling or bruise > 2 inches (5 cm)    Negative: Sounds like a serious injury to the triager    Negative: SEVERE neck pain (e.g.,  excruciating)    Negative: Suspicious history for the injury    Negative: Patient is confused or is an unreliable provider of information (e.g., dementia, profound mental retardation, alcohol intoxication)    Negative: High-risk adult (e.g., rheumatoid arthritis, ankylosing spondylitis, cervical spine abnormalities)    Negative: [1] Last tetanus shot > 5 years ago AND [2] DIRTY cut or scrape    Negative: [1] After 3 days (72 hours) AND [2] neck pain not improving    Negative: [1] Neck pain or swelling AND [2] present > 7 days    Neck swelling, bruise or pain from direct blow to the neck    Protocols used: NECK INJURY-ADULT-

## 2019-03-16 NOTE — LETTER
REPORT OF WORK COMP  Employee Name: Aye Lazo      : 1980     Work related injury: Yes  Employer at time of injury: [employer]  Employed elsewhere? Yes    Today's date: 2019    Date of injury: 19   Date of first visit: 19     1. Diagnosis:       ICD-10-CM    1. Neck pain M54.2 XR Cervical Spine 2/3 Views     cyclobenzaprine (FLEXERIL) 5 MG tablet     ibuprofen (ADVIL/MOTRIN) 200 MG tablet   2. Acute bilateral thoracic back pain M54.6 XR Thoracic Spine 2 Views     ibuprofen (ADVIL/MOTRIN) 200 MG tablet        2. Treatment:  rest, gentle range of motion exercises, ice  3. Medication: *cyclobenazaprine, ibuprofen   4. No work from 3/16/19 thorugh to 3/21/19.  5. Return to work date: TBD     Maximum Medical Improvement (Date): TBD  Any Permanent Partial Disability? TBD%    Provider comments:         Medical Examiner: Artem Archibald MD           License or registration: [id number]    Next appointment: Date - 3/21/19    CC: Employer, Managed Care Plan/Payor, Patient
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
## 2019-03-18 ENCOUNTER — TELEPHONE (OUTPATIENT)
Dept: FAMILY MEDICINE | Facility: CLINIC | Age: 39
End: 2019-03-18

## 2019-03-18 NOTE — TELEPHONE ENCOUNTER
Called patient @ [phone]    Patient stated she is not having any new symptoms, the symptoms she has been having is just worsening.     DENIES: CP, SOB, Difficulty Breathing, Dizziness/Lightheadedness, Numbness/Tingling, HA, Vision/Hearing Changes, N/V    OK for 3/21/19 OV  Advised patient that if new or worsening symptoms appear (reviewed new & worsening symptoms) to call the clinic or be seen in the the ER  Patient stated an understanding and agreed with plan.      Ashley Ayoub RN  [facility]

## 2019-03-18 NOTE — TELEPHONE ENCOUNTER
Patient calling, wanting to let the clinic know. She will be signing and sending a release of records to request her xray and office visit notes re:  Her injury. Please watch out for it.    Her condition is also worse. She is not getting any better.  She has a follow up on Thursday 3/21/19 with Dr. Archibald.    Thank you  Shelia Armando

## 2019-03-19 ENCOUNTER — TELEPHONE (OUTPATIENT)
Dept: FAMILY MEDICINE | Facility: CLINIC | Age: 39
End: 2019-03-19
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
## 2019-03-20 NOTE — TELEPHONE ENCOUNTER
Reason for Call:  Other call back    Detailed comments: Pt called this morning and would like to speak with a TC working with Dr. Archibald in regards to some WC forms questions, etc. Please give pt a call back when you can. Thank you.    Phone Number Patient can be reached at: Home number on file [phone] (home)    Best Time:     Can we leave a detailed message on this number? YES    Call taken on 3/20/2019 at 8:35 AM by Brigette Barkley

## 2019-03-20 NOTE — TELEPHONE ENCOUNTER
Asked Al to copy xrays to CD and bring to .  Spoke to patient and advised of this.  She will also bring CD from chiropractor to her appointment tomorrow.    Mariam Mensah

## 2019-03-21 ENCOUNTER — OFFICE VISIT (OUTPATIENT)
Dept: FAMILY MEDICINE | Facility: CLINIC | Age: 39
End: 2019-03-21
Payer: OTHER MISCELLANEOUS

## 2019-03-21 VITALS
SYSTOLIC BLOOD PRESSURE: 124 MMHG | OXYGEN SATURATION: 98 % | TEMPERATURE: 98 F | HEIGHT: 64 IN | DIASTOLIC BLOOD PRESSURE: 82 MMHG | HEART RATE: 92 BPM | BODY MASS INDEX: 20.83 KG/M2 | WEIGHT: 122 LBS

## 2019-03-21 DIAGNOSIS — M54.50 ACUTE BILATERAL LOW BACK PAIN WITHOUT SCIATICA: ICD-10-CM

## 2019-03-21 DIAGNOSIS — M54.6 ACUTE BILATERAL THORACIC BACK PAIN: ICD-10-CM

## 2019-03-21 DIAGNOSIS — M54.2 NECK PAIN: Primary | ICD-10-CM

## 2019-03-21 PROCEDURE — 99213 OFFICE O/P EST LOW 20 MIN: CPT | Performed by: FAMILY MEDICINE

## 2019-03-21 RX ORDER — HYDROCODONE BITARTRATE AND ACETAMINOPHEN 5; 325 MG/1; MG/1
1 TABLET ORAL EVERY 6 HOURS PRN
Qty: 20 TABLET | Refills: 0 | Status: SHIPPED | OUTPATIENT
Start: 2019-03-21 | End: 2019-03-28

## 2019-03-21 RX ORDER — BACLOFEN 10 MG/1
5-20 TABLET ORAL 3 TIMES DAILY PRN
Qty: 50 TABLET | Refills: 0 | Status: SHIPPED | OUTPATIENT
Start: 2019-03-21 | End: 2019-03-28

## 2019-03-21 ASSESSMENT — MIFFLIN-ST. JEOR: SCORE: 1213.39

## 2019-03-21 NOTE — LETTER
REPORT OF WORK COMP  Employee Name: Aye Lazo      : 1980     Work related injury: Yes  Employer at time of injury: [employer]  Employed elsewhere? Yes    Today's date: 2019    Date of injury: 19   Date of first visit: 19     1. Diagnosis:       ICD-10-CM    1. Neck pain M54.2 baclofen (LIORESAL) 10 MG tablet     HYDROcodone-acetaminophen (NORCO) 5-325 MG tablet   2. Acute bilateral thoracic back pain M54.6 baclofen (LIORESAL) 10 MG tablet     HYDROcodone-acetaminophen (NORCO) 5-325 MG tablet   3. Acute bilateral low back pain without sciatica M54.5 baclofen (LIORESAL) 10 MG tablet     HYDROcodone-acetaminophen (NORCO) 5-325 MG tablet        2. Treatment:  rest, gentle range of motion exercises, ice  3. Medication: baclofen, hydrocodone as needed, ibuprofen   4. No work from 3/16/19 through to 3/28/19.  5. Return to work date: TBD     Maximum Medical Improvement (Date): TBD  Any Permanent Partial Disability? TBD%    Provider comments:         Medical Examiner: Artem Archibald MD           License [place]: [id number]    Next appointment: Date - 3/28/19    CC: Employer, Managed Care Plan/Payor, Patient

## 2019-03-21 NOTE — PROGRESS NOTES
"  SUBJECTIVE:                                                      Aye Lazo is a 38 year old female who presents to clinic today for the following health issues:    Work Comp & Neck Injury Follow Up   DOI 03/16/2019   Aye reports that she's not feeling much better with continued neck (severe pain when rotating or moving), arm & leg weakness and her muscle relaxers are not effective. She also notes having back spasms. She's able to sleep albeit with careful adjustment. She describes her neck and spine feel they are \"pulled together\". 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       Problem list and histories reviewed & adjusted, as indicated.  Additional history: as documented    ROS:  Constitutional, HEENT, cardiovascular, pulmonary, GI, , musculoskeletal, neuro, skin, endocrine and psych systems are negative, except as otherwise noted.  This document serves as a record of the services and decisions personally performed and made by Tru Archibald MD. It was created on his behalf by Brandon Johnson, a trained medical scribe. The creation of this document is based the provider's statements to the medical scribe.  Scribe Brandon Johnson 1:40 PM, March 21, 2019    OBJECTIVE:                                                    /82   Pulse 92   Temp 98  F (36.7  C) (Oral)   Ht 1.626 m (5' 4\")   Wt 55.3 kg (122 lb)   LMP 03/02/2019 (Approximate)   SpO2 98%   BMI 20.94 kg/m   Body mass index is 20.94 kg/m .   GENERAL: healthy, alert, well nourished, well hydrated, no distress  HENT: ear canals- normal; TMs- normal; Nose- normal; Mouth- no ulcers, no lesions  NECK: bilateral paracervical, decreased ROM and tenderness, no adenopathy, no asymmetry, no masses, + stiffness, thyroid - normal to palpation  RESP: lungs clear to auscultation - no rales, no rhonchi, no wheezes  CV: regular rates and rhythm, normal S1 S2, no S3 or S4 and no murmur, no click or rub -  ABDOMEN: soft, no tenderness, " no  hepatosplenomegaly, no masses, normal bowel sounds  MS: extremities- no gross deformities noted, no edema  SKIN: no suspicious lesions, no rashes  NEURO: strength and tone- normal, sensory exam- grossly normal, mentation- intact, speech- normal, reflexes- symmetric  BACK: Paracervical muscles are tenderness, moderately stiff at cervical and thoracic regions, bilateral trigger point tenderness, moderate paralumbar tenderness    ASSESSMENT/PLAN:                                                    Aye was seen today for recheck.    Diagnoses and all orders for this visit:    Neck pain - Worsening, symptomatic cares discussed. Work comp forms completed. Begin:   -     baclofen (LIORESAL) 10 MG tablet; Take 0.5-2 tablets (5-20 mg) by mouth 3 times daily as needed for muscle spasms  -     HYDROcodone-acetaminophen (NORCO) 5-325 MG tablet; Take 1 tablet by mouth every 6 hours as needed for pain    Acute bilateral thoracic back pain - See above.   -     baclofen (LIORESAL) 10 MG tablet; Take 0.5-2 tablets (5-20 mg) by mouth 3 times daily as needed for muscle spasms  -     HYDROcodone-acetaminophen (NORCO) 5-325 MG tablet; Take 1 tablet by mouth every 6 hours as needed for pain    Acute bilateral low back pain without sciatica - See above.   -     baclofen (LIORESAL) 10 MG tablet; Take 0.5-2 tablets (5-20 mg) by mouth 3 times daily as needed for muscle spasms  -     HYDROcodone-acetaminophen (NORCO) 5-325 MG tablet; Take 1 tablet by mouth every 6 hours as needed for pain      Risks, benefits and alternatives of treatments discussed. Plan agreed on.      Followup: Return in about 1 week (around 3/28/2019) for Follow-up.     Formal physical therapy or chiro may help too.     See patient instructions.     BP Screening:   Last 3 BP Readings:    BP Readings from Last 3 Encounters:   03/21/19 124/82   03/16/19 124/82   08/15/17 113/77       The following was recommended to the patient:  Re-screen BP within a year and  "recommended lifestyle modifications  BMI:   Estimated body mass index is 20.94 kg/m  as calculated from the following:    Height as of this encounter: 1.626 m (5' 4\").    Weight as of this encounter: 55.3 kg (122 lb).     The information in this document, created by the medical scribe for me, accurately reflects the services I personally performed and the decisions made by me. I have reviewed and approved this document for accuracy prior to leaving the patient care area.  1:36 PM, 03/21/19        Artem Archibald MD   Pager: [phone]    "

## 2019-03-28 ENCOUNTER — OFFICE VISIT (OUTPATIENT)
Dept: FAMILY MEDICINE | Facility: CLINIC | Age: 39
End: 2019-03-28
Payer: OTHER MISCELLANEOUS

## 2019-03-28 VITALS
SYSTOLIC BLOOD PRESSURE: 122 MMHG | HEIGHT: 64 IN | OXYGEN SATURATION: 98 % | BODY MASS INDEX: 20.83 KG/M2 | HEART RATE: 107 BPM | DIASTOLIC BLOOD PRESSURE: 82 MMHG | WEIGHT: 122 LBS

## 2019-03-28 DIAGNOSIS — M54.6 ACUTE BILATERAL THORACIC BACK PAIN: ICD-10-CM

## 2019-03-28 DIAGNOSIS — M54.2 NECK PAIN: ICD-10-CM

## 2019-03-28 DIAGNOSIS — M54.50 ACUTE BILATERAL LOW BACK PAIN WITHOUT SCIATICA: ICD-10-CM

## 2019-03-28 PROCEDURE — 99213 OFFICE O/P EST LOW 20 MIN: CPT | Performed by: FAMILY MEDICINE

## 2019-03-28 RX ORDER — PREDNISONE 20 MG/1
TABLET ORAL
Qty: 14 TABLET | Refills: 0 | Status: SHIPPED | OUTPATIENT
Start: 2019-03-28 | End: 2019-05-09

## 2019-03-28 RX ORDER — HYDROCODONE BITARTRATE AND ACETAMINOPHEN 5; 325 MG/1; MG/1
1 TABLET ORAL EVERY 6 HOURS PRN
Qty: 25 TABLET | Refills: 0 | Status: SHIPPED | OUTPATIENT
Start: 2019-03-28 | End: 2019-04-09

## 2019-03-28 RX ORDER — BACLOFEN 10 MG/1
5-20 TABLET ORAL 3 TIMES DAILY PRN
Qty: 50 TABLET | Refills: 0 | Status: SHIPPED | OUTPATIENT
Start: 2019-03-28 | End: 2019-05-09

## 2019-03-28 ASSESSMENT — MIFFLIN-ST. JEOR: SCORE: 1213.39

## 2019-03-28 NOTE — PROGRESS NOTES
"  SUBJECTIVE:                                                      Aye Lazo is a 38 year old female who presents to clinic today for the following health issues:    Neck and back pain - Work Comp & Injury Follow Up  DOI 03/16/2019  Aye reports that her neck movement is slightly improved with slight movements but painful symptoms persist. She states that looking down is the worst of any motions. . Her baclofen and the hydrocodone (2 per day).  She has less pain in the morning but for only about 10 minutes and then tighten up. mall movement exercises at night while laying down.     Problem list and histories reviewed & adjusted, as indicated.  Additional history: as documented    ROS:  Constitutional, HEENT, cardiovascular, pulmonary, GI, , musculoskeletal, neuro, skin, endocrine and psych systems are negative, except as otherwise noted.    HEENT: more excessive phlegm, decreased hearing with left ear fullness  This document serves as a record of the services and decisions personally performed and made by Tru Archibald MD. It was created on his behalf by Brandon Johnson, a trained medical scribe. The creation of this document is based the provider's statements to the medical scribe.  Scribe Brandon Johnson 11:27 AM, March 28, 2019    OBJECTIVE:                                                      /82   Pulse 107   Ht 1.626 m (5' 4\")   Wt 55.3 kg (122 lb)   LMP 03/02/2019 (Approximate)   SpO2 98%   BMI 20.94 kg/m   Body mass index is 20.94 kg/m .   GENERAL: healthy, alert, well nourished, well hydrated, no distress  HENT: ear canals- Left ear with effusion with minimal redness; TMs- normal; Nose- normal; Mouth- no ulcers, no lesions  NECK: bilateral paracervical and suboccipital  tenderness, no adenopathy, no asymmetry, no masses, + for stiffness; thyroid- normal to palpation  RESP: lungs clear to auscultation - no rales, no rhonchi, no wheezes  CV: regular rates and rhythm, normal S1 S2, no S3 or S4 and no " murmur, no click or rub -  ABDOMEN: soft, no tenderness, no  hepatosplenomegaly, no masses, normal bowel sounds  MS: extremities- no gross deformities noted, no edema  SKIN: no suspicious lesions, no rashes  BACK: negative straight leg raising  bilateral paralumbar tenderness    ASSESSMENT/PLAN:                                                    Aye was seen today for recheck.    Diagnoses and all orders for this visit:    Neck pain - Ongoing condition with slight improvement but still very stiff in the neck region, adding prednisone, continue other medications - PT referral given.   -     predniSONE (DELTASONE) 20 MG tablet; Take 40 mg by mouth daily for 4 days, THEN 20 mg daily for 4 days, THEN 10 mg daily for 4 days.  -     baclofen (LIORESAL) 10 MG tablet; Take 0.5-2 tablets (5-20 mg) by mouth 3 times daily as needed for muscle spasms  -     HYDROcodone-acetaminophen (NORCO) 5-325 MG tablet; Take 1 tablet by mouth every 6 hours as needed for pain  -     [facility], [facility] CHIROPRACTIC REFERRAL; Future    Acute bilateral thoracic back pain - see above  -     baclofen (LIORESAL) 10 MG tablet; Take 0.5-2 tablets (5-20 mg) by mouth 3 times daily as needed for muscle spasms  -     HYDROcodone-acetaminophen (NORCO) 5-325 MG tablet; Take 1 tablet by mouth every 6 hours as needed for pain    Acute bilateral low back pain without sciatica - see above  -     predniSONE (DELTASONE) 20 MG tablet; Take 40 mg by mouth daily for 4 days, THEN 20 mg daily for 4 days, THEN 10 mg daily for 4 days.  -     baclofen (LIORESAL) 10 MG tablet; Take 0.5-2 tablets (5-20 mg) by mouth 3 times daily as needed for muscle spasms  -     HYDROcodone-acetaminophen (NORCO) 5-325 MG tablet; Take 1 tablet by mouth every 6 hours as needed for pain  -     [facility], [facility] CHIR[facility]RACTIC REFERRAL; Future        Risks, benefits and alternatives of treatments discussed. Plan agreed on.      Followup: Return in about 2 weeks (around 4/11/2019) for  recheck.    See RWA    The information in this document, created by the medical scribe for me, accurately reflects the services I personally performed and the decisions made by me. I have reviewed and approved this document for accuracy prior to leaving the patient care area.  11:34 AM, 03/28/19        Artem Archibald MD   Pager: [phone]

## 2019-03-28 NOTE — LETTER
REPORT OF WORK COMP  Employee Name: Aye Lazo      : 1980     Work related injury: Yes  Employer at time of injury: [employer]  Employed elsewhere? Yes    Today's date: 2019    Date of injury: 19   Date of first visit: 19     1. Diagnosis:       ICD-10-CM    1. Neck pain M54.2 predniSONE (DELTASONE) 20 MG tablet     baclofen (LIORESAL) 10 MG tablet     HYDROcodone-acetaminophen (NORCO) 5-325 MG tablet     [facility] REFERRAL   2. Acute bilateral thoracic back pain M54.6 baclofen (LIORESAL) 10 MG tablet     HYDROcodone-acetaminophen (NORCO) 5-325 MG tablet   3. Acute bilateral low back pain without sciatica M54.5 predniSONE (DELTASONE) 20 MG tablet     baclofen (LIORESAL) 10 MG tablet     HYDROcodone-acetaminophen (NORCO) 5-325 MG tablet     [facility] REFERRAL        2. Treatment: physical therapy,  rest, gentle range of motion exercises, ice  3. Medication: baclofen, hydrocodone as needed, prednisone  4. No work from 3/28/19 through to 4/10/19.  5. Return to work date: TBD     Maximum Medical Improvement (Date): TBD  Any Permanent Partial Disability? TBD%    Provider comments:         Medical Examiner: Artem Archibald MD           License or registration: [id number]    Next appointment: Date - 3/28/19    CC: Employer, Managed Care Plan/Payor, Patient

## 2019-04-01 ENCOUNTER — THERAPY VISIT (OUTPATIENT)
Dept: PHYSICAL THERAPY | Facility: CLINIC | Age: 39
End: 2019-04-01
Payer: OTHER MISCELLANEOUS

## 2019-04-01 DIAGNOSIS — M54.50 ACUTE BILATERAL LOW BACK PAIN WITHOUT SCIATICA: ICD-10-CM

## 2019-04-01 DIAGNOSIS — M54.2 NECK PAIN: ICD-10-CM

## 2019-04-01 PROCEDURE — 97161 PT EVAL LOW COMPLEX 20 MIN: CPT | Mod: GP | Performed by: PHYSICAL THERAPIST

## 2019-04-01 PROCEDURE — 97110 THERAPEUTIC EXERCISES: CPT | Mod: GP | Performed by: PHYSICAL THERAPIST

## 2019-04-01 NOTE — PROGRESS NOTES
[facility] Initial Evaluation  Subjective:  The history is provided by the patient. No  was used.   Aye Lazo is a 38 year old female with a lumbar and sacroiliac (neck pain) condition.  Condition occurred with:  A fall/slip (Neck and back pain tightness after fall on ice 3/16/19 while walking at work as a  (slipped on icy sidewalk). Xray negative, feeling limited movement/ROM since fall.  Pt will start prednisone soon.).  Condition occurred: at work.  This is a new condition  3/16/19.    Patient reports pain:  Central lumbar spine and lower lumbar spine (B cervical spine).    Pain is described as aching and cramping and is intermittent and reported as 4/10.  Associated symptoms:  Loss of motion/stiffness. Pain is worse during the day.  Symptoms are exacerbated by carrying, certain positions, lifting and walking (driving, turning head) and relieved by analgesics and muscle relaxants.  Since onset symptoms are unchanged.        General health as reported by patient is good.  Pertinent medical history includes:  Depression and smoking.  Medical allergies: yes.  Other surgeries include:  Other.  Current medications:  Muscle relaxants and pain medication.  Current occupation is  ([employer])  .  Patient is currently not working due to present treatment problem.  Primary job tasks include:  Prolonged standing, lifting, repetitive tasks and driving.    Barriers include:  None as reported by the patient.    Red flags:  None as reported by the patient.                        Objective:  Standing Alignment:    Cervical/Thoracic:  Cervical lordosis decreased and thoracic kyphosis decreased    Lumbar:  Lordosis decr                           Lumbar/SI Evaluation  ROM:    AROM Lumbar:   Flexion:          Mod loss to knees w pain  Ext:                    Min loss    Side Bend:        Left:  Min loss    Right:  Min loss  Rotation:           Left:      Right:   Side Glide:        Left:     Right:           Lumbar Myotomes:  Lumbar myotomes: B hamstring MMT 4-/5 Bilat.  T12-L3 (Hip Flex):  Left: 5-    Right: 5-  L2-4 (Quads):  Left:  5-    Right:  5-  L4 (Ankle DF):  Left:  5-    Right:  5-                           Cervical/Thoracic Evaluation  Arom wnl cervical: CAROM Rotation 15 degrees Bilat w pain.     AROM:  AROM Cervical:    Flexion:            Max loss pulls  Extension:       Max loss   Rotation:         Left: max loss     Right: max loss  Side Bend:      Left: mod loss     Right:  Mod loss      Headaches: none  Cervical Myotomes:        C4 (shrug):  Left: 4+    Right: 4+  C5 (Deltoid):  Left: 4+    Right: 4+  C6 (Biceps):  Left: 5-    Right: 5-  C7 (Triceps):  Left: 5-    Right: 5-                                                              General     ROS    Assessment/Plan:    Patient is a 38 year old female with cervical and lumbar complaints.    Patient has the following significant findings with corresponding treatment plan.                Diagnosis 1:  Cervical, lumbar pain post fall while working at   Pain -  hot/cold therapy, electric stimulation, manual therapy, splint/taping/bracing/orthotics, self management, education and home program  Decreased ROM/flexibility - manual therapy and therapeutic exercise  Decreased strength - therapeutic exercise and therapeutic activities  Impaired gait - gait training  Decreased function - therapeutic activities  Impaired posture - neuro re-education    Previous and current functional limitations:  (See Goal Flow Sheet for this information)    Short term and Long term goals: (See Goal Flow Sheet for this information)     Communication ability:  Patient appears to be able to clearly communicate and understand verbal and written communication and follow directions correctly.  Treatment Explanation - The following has been discussed with the patient:   RX ordered/plan of care  Anticipated  outcomes  Possible risks and side effects  This patient would benefit from PT intervention to resume normal activities.   Rehab potential is good.    Frequency:  1-2 X week, once daily  Duration:  for 4 weeks  Discharge Plan:  Achieve all LTG.  Independent in home treatment program.  Reach maximal therapeutic benefit.    Please refer to the daily flowsheet for treatment today, total treatment time and time spent performing 1:1 timed codes.

## 2019-04-05 ENCOUNTER — THERAPY VISIT (OUTPATIENT)
Dept: PHYSICAL THERAPY | Facility: CLINIC | Age: 39
End: 2019-04-05
Payer: OTHER MISCELLANEOUS

## 2019-04-05 DIAGNOSIS — M54.50 ACUTE BILATERAL LOW BACK PAIN WITHOUT SCIATICA: ICD-10-CM

## 2019-04-05 DIAGNOSIS — M54.2 NECK PAIN: ICD-10-CM

## 2019-04-05 PROCEDURE — 97110 THERAPEUTIC EXERCISES: CPT | Mod: GP | Performed by: PHYSICAL THERAPIST

## 2019-04-05 PROCEDURE — 97140 MANUAL THERAPY 1/> REGIONS: CPT | Mod: GP | Performed by: PHYSICAL THERAPIST

## 2019-04-05 PROCEDURE — 97014 ELECTRIC STIMULATION THERAPY: CPT | Mod: GP | Performed by: PHYSICAL THERAPIST

## 2019-04-08 ENCOUNTER — THERAPY VISIT (OUTPATIENT)
Dept: PHYSICAL THERAPY | Facility: CLINIC | Age: 39
End: 2019-04-08
Payer: OTHER MISCELLANEOUS

## 2019-04-08 DIAGNOSIS — M54.2 NECK PAIN: ICD-10-CM

## 2019-04-08 DIAGNOSIS — M54.50 ACUTE BILATERAL LOW BACK PAIN WITHOUT SCIATICA: ICD-10-CM

## 2019-04-08 PROCEDURE — 97140 MANUAL THERAPY 1/> REGIONS: CPT | Mod: GP | Performed by: PHYSICAL THERAPIST

## 2019-04-08 PROCEDURE — 97110 THERAPEUTIC EXERCISES: CPT | Mod: GP | Performed by: PHYSICAL THERAPIST

## 2019-04-08 PROCEDURE — 97035 APP MDLTY 1+ULTRASOUND EA 15: CPT | Mod: GP | Performed by: PHYSICAL THERAPIST

## 2019-04-08 NOTE — PROGRESS NOTES
Subjective:  HPI                    Objective:  System    Physical Exam    General     ROS    Assessment/Plan:    SUBJECTIVE  Subjective changes as noted by pt:  Aye returns for her 3rd PT visit with feeling improved lumbar ROM and a little improved neck ROM.  Looking L is most limited still.  Aye is walking a little more but have not pushed it with distance.   Changes in function:  Yes (See Goal flowsheet attached for changes in current functional level)     Adverse reaction to treatment or activity:  None    OBJECTIVE  Changes in objective findings:  Yes, Cervical AROM Rot L mod loss, Rot R min loss, SB L mod loss, SB R mod loss.  Thoracic Extension mod loss, Flex min loss.  Lumbar AROM Flex min loss, Ext min loss, SB L/R WNL.    Gait - good upright posture (recommend patient walk a little more for distance, as her work requires her walking 4-5 miles per day).  Strengthening- started upper back strengthening and patient tolerated this well.  Objective: R scap tenderness, CROM improving     ASSESSMENT  Aye continues to require intervention to meet STG and LTG's: PT  Patient's symptoms are resolving.  Response to therapy has shown an improvement in  pain level, ROM , strength and posture  Progress made towards STG/LTG?  Yes (See Goal flowsheet attached for updates on achievement of STG and LTG)    PLAN  Current treatment program is being advanced to more complex exercises.  Aye is now progressing with light overpressure neck stretches and strengthening program now.  Pt's work insurance seems to not allow both PT and Chiro treatments - but if her L neck Rotation remains limited she would benefit from some Chiropractic treatments for this.  She could see Brynn Wade D.C at [facility] in the future if needed.    PTA/ATC plan:  N/A    Please refer to the daily flowsheet for treatment today, total treatment time and time spent performing 1:1 timed codes.

## 2019-04-09 ENCOUNTER — OFFICE VISIT (OUTPATIENT)
Dept: FAMILY MEDICINE | Facility: CLINIC | Age: 39
End: 2019-04-09
Payer: OTHER MISCELLANEOUS

## 2019-04-09 VITALS
HEART RATE: 92 BPM | DIASTOLIC BLOOD PRESSURE: 82 MMHG | SYSTOLIC BLOOD PRESSURE: 122 MMHG | TEMPERATURE: 98 F | RESPIRATION RATE: 14 BRPM | OXYGEN SATURATION: 98 % | WEIGHT: 122 LBS | BODY MASS INDEX: 20.83 KG/M2 | HEIGHT: 64 IN

## 2019-04-09 DIAGNOSIS — M62.838 MUSCLE SPASMS OF NECK: Primary | ICD-10-CM

## 2019-04-09 DIAGNOSIS — M54.2 NECK PAIN: ICD-10-CM

## 2019-04-09 DIAGNOSIS — M54.50 ACUTE BILATERAL LOW BACK PAIN WITHOUT SCIATICA: ICD-10-CM

## 2019-04-09 DIAGNOSIS — M54.6 ACUTE BILATERAL THORACIC BACK PAIN: ICD-10-CM

## 2019-04-09 PROCEDURE — 99213 OFFICE O/P EST LOW 20 MIN: CPT | Performed by: FAMILY MEDICINE

## 2019-04-09 RX ORDER — HYDROCODONE BITARTRATE AND ACETAMINOPHEN 5; 325 MG/1; MG/1
1 TABLET ORAL EVERY 6 HOURS PRN
Qty: 25 TABLET | Refills: 0 | Status: SHIPPED | OUTPATIENT
Start: 2019-04-09 | End: 2019-04-30

## 2019-04-09 RX ORDER — TIZANIDINE 2 MG/1
2 TABLET ORAL 3 TIMES DAILY PRN
Qty: 30 TABLET | Refills: 1 | Status: SHIPPED | OUTPATIENT
Start: 2019-04-09 | End: 2019-05-09

## 2019-04-09 ASSESSMENT — MIFFLIN-ST. JEOR: SCORE: 1213.39

## 2019-04-09 NOTE — PROGRESS NOTES
"  SUBJECTIVE:                                                      Aye Lazo is a 38 year old female who presents to clinic today for the following health issues:    Neck and Shoulder Pain Follow Up & Work Comp   DOI 03/16/2019    Status: Aye reports that she has been attending PT session twice a week but would like to see a chiropractor but work comp will not cover. Her pain and ROM is slightly improved with PT and Norco -- slow but steady progress is being made. She is getting some ROM back in the neck but gets tight with movement and encounters of continued neck spasms, shoulder blade pain, and tingling in the left size.     Problem list and histories reviewed & adjusted, as indicated.  Additional history: as documented    ROS:  Constitutional, HEENT, cardiovascular, pulmonary, GI, , musculoskeletal, neuro, skin, endocrine and psych systems are negative, except as otherwise noted.  This document serves as a record of the services and decisions personally performed and made by Tru Archibald MD. It was created on his behalf by Brandon Johnson, a trained medical scribe. The creation of this document is based the provider's statements to the medical scribe.  Scribe Brandon Johnson 11:26 AM, April 9, 2019    OBJECTIVE:                                                    /82   Pulse 92   Temp 98  F (36.7  C) (Oral)   Resp 14   Ht 1.626 m (5' 4\")   Wt 55.3 kg (122 lb)   SpO2 98%   BMI 20.94 kg/m   Body mass index is 20.94 kg/m .   GENERAL: healthy, alert, well nourished, well hydrated, no distress  HENT: ear canals- normal; TMs- normal; Nose- normal; Mouth- no ulcers, no lesions  NECK: Left suboccipital trigger tenderness, paracervical muscles are tender left greater than right, significantly reduced ROM, no adenopathy, no asymmetry, no masses, + for stiffness; thyroid- normal to palpation  RESP: lungs clear to auscultation - no rales, no rhonchi, no wheezes  CV: regular rates and rhythm, normal S1 S2, no S3 or " S4 and no murmur, no click or rub -  ABDOMEN: soft, no tenderness, no  hepatosplenomegaly, no masses, normal bowel sounds  MS: extremities- no gross deformities noted, no edema  SKIN: no suspicious lesions, no rashes  BACK: Supraspinatus, right rhomboid, and right trapezius trigger point tenderness, otherwise no other CVA tenderness, no paralumbar tenderness  NEURO: strength and tone- normal, sensory exam- grossly normal, mentation- intact, speech- normal, reflexes- symmetric    ASSESSMENT/PLAN:                                                    Aye was seen today for recheck.    Diagnoses and all orders for this visit:    Muscle spasms of neck - Ongoing condition, intermittent spasms, begin:   -     tiZANidine (ZANAFLEX) 2 MG tablet; Take 1 tablet (2 mg) by mouth 3 times daily as needed for muscle spasms  -     CHIROPRACTIC REFERRAL    Neck pain - Ongoing condition with continued slight improvements but still stiff. Receptive to PT - chiropractor referral given. Continue:   -     tiZANidine (ZANAFLEX) 2 MG tablet; Take 1 tablet (2 mg) by mouth 3 times daily as needed for muscle spasms  -     HYDROcodone-acetaminophen (NORCO) 5-325 MG tablet; Take 1 tablet by mouth every 6 hours as needed for pain  -     CHIROPRACTIC REFERRAL    Acute bilateral thoracic back pain - see above  -     tiZANidine (ZANAFLEX) 2 MG tablet; Take 1 tablet (2 mg) by mouth 3 times daily as needed for muscle spasms  -     HYDROcodone-acetaminophen (NORCO) 5-325 MG tablet; Take 1 tablet by mouth every 6 hours as needed for pain  -     CHIROPRACTIC REFERRAL    Acute bilateral low back pain without sciatica - see above  -     tiZANidine (ZANAFLEX) 2 MG tablet; Take 1 tablet (2 mg) by mouth 3 times daily as needed for muscle spasms  -     HYDROcodone-acetaminophen (NORCO) 5-325 MG tablet; Take 1 tablet by mouth every 6 hours as needed for pain  -     CHIROPRACTIC REFERRAL    Risks, benefits and alternatives of treatments discussed. Plan agreed  "on.      Followup: Return in about 1 month (around 5/9/2019) for Follow-up.    See patient instructions.     BP Screening:   Last 3 BP Readings:    BP Readings from Last 3 Encounters:   04/09/19 122/82   03/28/19 122/82   03/21/19 124/82       The following was recommended to the patient:  Re-screen BP within a year and recommended lifestyle modifications  BMI:   Estimated body mass index is 20.94 kg/m  as calculated from the following:    Height as of this encounter: 1.626 m (5' 4\").    Weight as of this encounter: 55.3 kg (122 lb).     The information in this document, created by the medical scribe for me, accurately reflects the services I personally performed and the decisions made by me. I have reviewed and approved this document for accuracy prior to leaving the patient care area.  11:27 AM, 04/09/19        Artem Archibald MD   Pager: [phone]    "

## 2019-04-09 NOTE — LETTER
REPORT OF WORK COMP  Employee Name: Aye Lazo      : 1980     Work related injury: Yes  Employer at time of injury: [employer]  Employed elsewhere? Yes    Today's date: 2019  Date of injury: 19   Date of first visit: 19     1. Diagnosis:     ICD-10-CM    1. Muscle spasms of neck M62.838 tiZANidine (ZANAFLEX) 2 MG tablet     CHIROPRACTIC REFERRAL   2. Neck pain M54.2 tiZANidine (ZANAFLEX) 2 MG tablet     HYDROcodone-acetaminophen (NORCO) 5-325 MG tablet     CHIROPRACTIC REFERRAL   3. Acute bilateral thoracic back pain M54.6 tiZANidine (ZANAFLEX) 2 MG tablet     HYDROcodone-acetaminophen (NORCO) 5-325 MG tablet     CHIROPRACTIC REFERRAL   4. Acute bilateral low back pain without sciatica M54.5 tiZANidine (ZANAFLEX) 2 MG tablet     HYDROcodone-acetaminophen (NORCO) 5-325 MG tablet     CHIROPRACTIC REFERRAL   2. Treatment: physical therapy (cotn exercises ) and will add chiropractic,  rest, gentle range of motion exercises, ice  3. Medication: baclofen - switch to tizandine, hydrocodone as needed, prednisone course a- has a few days left  4. No work from 19  through to 19.  5. Return to work date: TBD     Maximum Medical Improvement (Date): TBD  Any Permanent Partial Disability? TBD%    Provider comments:         Medical Examiner: Artem Archibald MD           [id number]    Next appointment: Date - 19  CC: Employer, Managed Care Plan/Payor, Patient

## 2019-04-16 ENCOUNTER — TELEPHONE (OUTPATIENT)
Dept: FAMILY MEDICINE | Facility: CLINIC | Age: 39
End: 2019-04-16

## 2019-04-16 ENCOUNTER — THERAPY VISIT (OUTPATIENT)
Dept: PHYSICAL THERAPY | Facility: CLINIC | Age: 39
End: 2019-04-16
Payer: OTHER MISCELLANEOUS

## 2019-04-16 DIAGNOSIS — M54.2 NECK PAIN: ICD-10-CM

## 2019-04-16 DIAGNOSIS — M54.50 ACUTE BILATERAL LOW BACK PAIN WITHOUT SCIATICA: ICD-10-CM

## 2019-04-16 PROCEDURE — 97140 MANUAL THERAPY 1/> REGIONS: CPT | Mod: GP | Performed by: PHYSICAL THERAPIST

## 2019-04-16 PROCEDURE — 97035 APP MDLTY 1+ULTRASOUND EA 15: CPT | Mod: GP | Performed by: PHYSICAL THERAPIST

## 2019-04-16 PROCEDURE — 97110 THERAPEUTIC EXERCISES: CPT | Mod: GP | Performed by: PHYSICAL THERAPIST

## 2019-04-16 NOTE — TELEPHONE ENCOUNTER
Routing to [facility] to review.    Julissa Espinosa, BS, RN, N  [facility]) [phone]

## 2019-04-16 NOTE — LETTER
[address] [address]  Phone: [phone]      REPORT OF WORK ABILITY    NOTE TO EMPLOYEE: You must promptly provide a copy of this report to your  employer or worker's compensation insurer, and Qualified Rehabilitation Consultant.    Date: 4/26/2019                     Employee Name: Aye Lazo         YOB: 1980  Medical Record Number: [id number]   Soc.Sec.No: [id number]  Employer: None                Date of Injury: 03/16/2019  Managed Care Organization / Insurance Company Name: Work Comp: [employer]           Diagnosis:   Diagnosis:       ICD-10-CM    1. Muscle spasms of neck M62.838           2. Neck pain M54.2                  3. Acute bilateral thoracic back pain M54.6                  4. Acute bilateral low back pain without sciatica M54.5                Dates of Examination and Treatment:   03/16/2019, 03/21/2019, 03/28/2019, 04/09/2019  See attached office visit notes for treatment.    Work Related: yes     MMI: TBD  Permanent Partial Disability(PPD) likely: TBD    EMPLOYEE IS ABLE TO WORK:   No work from 04/09/19 through to 5/9/19. Return to work date: TBD      RESTRICTIONS IF ANY:  N/A - Employee unable to work      TREATMENT PLAN/NOTES:   2. Treatment: physical therapy (cotn exercises) and will add chiropractic,  rest, gentle range of motion exercises, ice.  3. Medication: baclofen - switch to tizandine, hydrocodone as needed, prednisone course a- has a few days left.        Please contact me with questions or concerns.     Thank you,           rAtem Archibald M.D.

## 2019-04-16 NOTE — TELEPHONE ENCOUNTER
Reason for Call:  Other call back    Detailed comments: Pt called this morning and would like to leave a message for Dr. Archibald in regards to her work ability forms and if they are complete or not. Please give pt a call with an update when you can. Thank you.    Phone Number Patient can be reached at: Home number on file [phone] (home)    Best Time:     Can we leave a detailed message on this number? YES    Call taken on 4/16/2019 at 8:06 AM by Brigette Barkley

## 2019-04-18 ENCOUNTER — THERAPY VISIT (OUTPATIENT)
Dept: PHYSICAL THERAPY | Facility: CLINIC | Age: 39
End: 2019-04-18
Payer: OTHER MISCELLANEOUS

## 2019-04-18 DIAGNOSIS — M54.2 NECK PAIN: ICD-10-CM

## 2019-04-18 DIAGNOSIS — M54.50 ACUTE BILATERAL LOW BACK PAIN WITHOUT SCIATICA: ICD-10-CM

## 2019-04-18 PROCEDURE — 97110 THERAPEUTIC EXERCISES: CPT | Mod: GP | Performed by: PHYSICAL THERAPIST

## 2019-04-18 PROCEDURE — 97112 NEUROMUSCULAR REEDUCATION: CPT | Mod: GP | Performed by: PHYSICAL THERAPIST

## 2019-04-18 PROCEDURE — 97035 APP MDLTY 1+ULTRASOUND EA 15: CPT | Mod: GP | Performed by: PHYSICAL THERAPIST

## 2019-04-19 NOTE — TELEPHONE ENCOUNTER
Patient calling regarding workability forms.  She said the deadline is 30 days from 4/9/2019.      Patient can be reached [phone], okay to leave detailed message.        JASON Duval, RN, [facility]) [phone]

## 2019-04-23 NOTE — TELEPHONE ENCOUNTER
Reason for Call:  Other     Detailed comments: The patient is calling back again regarding these workability forms. She says this is the fourth time she has called in. She says she is getting very upset that these haven't been done yet. The nurse she spoke to last time said she will talk to Dr. Archibald and then call her back. She never received a call back. She said if she doesn't hear anything back by today, she will stop in the clinic to get these done.    Phone Number Patient can be reached at: Cell number on file:    Telephone Information:   Mobile [phone]     Best Time: Anytime    Can we leave a detailed message on this number? YES    Call taken on 4/23/2019 at 10:49 AM by Naomie Bianchi

## 2019-04-24 NOTE — TELEPHONE ENCOUNTER
MD MARION out of clinic today, back tomorrow    Called patient @ # below - advised of notes below. Patient stated she needs this completed ASAP - stated she has 30 days to complete the form and MD MARION has had the form for 2 weeks now.   Stated it is Federal Work Comp forms and there is a lot of hoops to jump through so if anything is not accepted and she needs to correct it she needs to do that within the 30 day window.     Routing to PCP on HIGH priority for further review/recommendations/orders.    Ashley Ayoub RN  [facility]

## 2019-04-24 NOTE — TELEPHONE ENCOUNTER
Patient calling about her forms. It is very time sensitive. She was told she would receive a call back, but has not heard anything. She wanted to make an appt with Dr. Archibald but no openings. She would like to speak to someone at the clinic re: the form today.  [phone] (home)   Ok to leave detailed message: yes    Thank you  Shelia Armando

## 2019-04-25 ENCOUNTER — THERAPY VISIT (OUTPATIENT)
Dept: PHYSICAL THERAPY | Facility: CLINIC | Age: 39
End: 2019-04-25
Payer: OTHER MISCELLANEOUS

## 2019-04-25 DIAGNOSIS — M54.50 ACUTE BILATERAL LOW BACK PAIN WITHOUT SCIATICA: ICD-10-CM

## 2019-04-25 DIAGNOSIS — M54.2 NECK PAIN: ICD-10-CM

## 2019-04-25 PROCEDURE — 97035 APP MDLTY 1+ULTRASOUND EA 15: CPT | Mod: GP | Performed by: PHYSICAL THERAPIST

## 2019-04-25 PROCEDURE — 97110 THERAPEUTIC EXERCISES: CPT | Mod: GP | Performed by: PHYSICAL THERAPIST

## 2019-04-25 PROCEDURE — 97140 MANUAL THERAPY 1/> REGIONS: CPT | Mod: GP | Performed by: PHYSICAL THERAPIST

## 2019-04-25 NOTE — PROGRESS NOTES
Subjective:  HPI                    Objective:  System    Physical Exam    General     ROS    Assessment/Plan:    DISCHARGE REPORT    Progress reporting period is from initial to 4/25.       SUBJECTIVE  Subjective changes noted by patient:  Aye returns to PT today feeling some improvement with her low back and mid back, and small changes with neck.  She will see her MD May 9th again for work assessment, also will start Chrio soon and then hold on PT for the time being.    Changes in function:  Yes (See Goal flowsheet attached for changes in current functional level)  Adverse reaction to treatment or activity: None    OBJECTIVE  Changes noted in objective findings:  Yes, shoulder AROM WNL, lumbar spine improved, cervical ROM continues to be limited with Rotation and Lateral side bending Bilat.  Objective: CAROM Mod loss with feeling of tightness and shaky in all planes.    ASSESSMENT/PLAN  Updated problem list and treatment plan: Diagnosis 1:  Cervical-Thoracic pain, weakness  Pain -  US, manual therapy, self management, education and home program  Decreased ROM/flexibility - manual therapy and therapeutic exercise  Decreased joint mobility - manual therapy and therapeutic exercise  Decreased strength - therapeutic exercise and therapeutic activities  Decreased function - therapeutic activities  Impaired posture - neuro re-education  STG/LTGs have been met or progress has been made towards goals:  Yes (See Goal flow sheet completed today.)  Assessment of Progress: The patient's condition has potential to improve.  The patient's progress has slowed.  Self Management Plans:  Patient has been instructed in a home treatment program.  Patient  has been instructed in self management of symptoms.  I have re-evaluated this patient and find that the nature, scope, duration and intensity of the therapy is appropriate for the medical condition of the patient.  Aye continues to require the following intervention to  meet STG and LTG's:  Chiropractic    Recommendations:  This patient is ready to be discharged from therapy and continue their home treatment program to focus on Chiropractic treatments.  Patient will continue with her neck ROM, postural strengthening exercises and also progress her walking as when she returns to work she needs to keep her aerobic conditioning walking 4 miles or so per day.    Please refer to the daily flowsheet for treatment today, total treatment time and time spent performing 1:1 timed codes.

## 2019-04-29 DIAGNOSIS — M54.50 ACUTE BILATERAL LOW BACK PAIN WITHOUT SCIATICA: ICD-10-CM

## 2019-04-29 DIAGNOSIS — M54.2 NECK PAIN: ICD-10-CM

## 2019-04-29 DIAGNOSIS — M54.6 ACUTE BILATERAL THORACIC BACK PAIN: ICD-10-CM

## 2019-04-29 NOTE — TELEPHONE ENCOUNTER
Routing refill request to provider for review/approval because:  Drug not on the [facility] refill protocol   Nely Wallace RN  [facility]

## 2019-04-29 NOTE — TELEPHONE ENCOUNTER
Controlled Substance Refill Request for HYDROcodone-acetaminophen (NORCO) 5-325 MG tablet  Problem List Complete:  Yes    Last Written Prescription Date:  4.9.19  Last Fill Quantity: 25,   # refills: 0      Last Office Visit with [facility] primary care provider: 4.9.19    Future Office visit:   Next 5 appointments (look out 90 days)    May 09, 2019  9:40 AM CDT  Office Visit with Tru [facility]ald MD  [facility] [address] [place] [id number] [phone]          Controlled substance agreement:   Encounter-Level CSA:    There are no encounter-level csa.     Patient-Level CSA:    There are no patient-level csa.         Last Urine Drug Screen: No results found for: CDAUT, No results found for: COMDAT, No results found for: THC13, PCP13, COC13, MAMP13, OPI13, AMP13, BZO13, TCA13, MTD13, BAR13, OXY13, PPX13, BUP13     Processing:  Fax Rx to listed pharmacy    [email or web address]   checked in past 3 months?  No, route to RN

## 2019-04-30 RX ORDER — HYDROCODONE BITARTRATE AND ACETAMINOPHEN 5; 325 MG/1; MG/1
1 TABLET ORAL EVERY 6 HOURS PRN
Qty: 25 TABLET | Refills: 0 | Status: SHIPPED | OUTPATIENT
Start: 2019-04-30 | End: 2019-05-09

## 2019-04-30 NOTE — TELEPHONE ENCOUNTER
Prescription(s) signed and in the [facility].  Please process and notify the patient, if needed.

## 2019-05-09 ENCOUNTER — OFFICE VISIT (OUTPATIENT)
Dept: FAMILY MEDICINE | Facility: CLINIC | Age: 39
End: 2019-05-09
Payer: OTHER MISCELLANEOUS

## 2019-05-09 VITALS
HEART RATE: 97 BPM | RESPIRATION RATE: 14 BRPM | WEIGHT: 122 LBS | TEMPERATURE: 98 F | HEIGHT: 64 IN | DIASTOLIC BLOOD PRESSURE: 82 MMHG | BODY MASS INDEX: 20.83 KG/M2 | SYSTOLIC BLOOD PRESSURE: 118 MMHG | OXYGEN SATURATION: 98 %

## 2019-05-09 DIAGNOSIS — M54.2 NECK PAIN: ICD-10-CM

## 2019-05-09 DIAGNOSIS — M43.6 STIFFNESS OF NECK: Primary | ICD-10-CM

## 2019-05-09 DIAGNOSIS — M54.6 ACUTE BILATERAL THORACIC BACK PAIN: ICD-10-CM

## 2019-05-09 DIAGNOSIS — M54.50 ACUTE BILATERAL LOW BACK PAIN WITHOUT SCIATICA: ICD-10-CM

## 2019-05-09 DIAGNOSIS — M62.838 MUSCLE SPASMS OF NECK: ICD-10-CM

## 2019-05-09 PROCEDURE — 99214 OFFICE O/P EST MOD 30 MIN: CPT | Performed by: FAMILY MEDICINE

## 2019-05-09 RX ORDER — HYDROCODONE BITARTRATE AND ACETAMINOPHEN 5; 325 MG/1; MG/1
1 TABLET ORAL EVERY 6 HOURS PRN
Qty: 25 TABLET | Refills: 0 | Status: SHIPPED | OUTPATIENT
Start: 2019-05-14 | End: 2019-06-03

## 2019-05-09 RX ORDER — TIZANIDINE 2 MG/1
2 TABLET ORAL 3 TIMES DAILY PRN
Qty: 30 TABLET | Refills: 1 | Status: SHIPPED | OUTPATIENT
Start: 2019-05-09 | End: 2019-09-19

## 2019-05-09 ASSESSMENT — MIFFLIN-ST. JEOR: SCORE: 1213.39

## 2019-05-09 NOTE — LETTER
REPORT OF WORK ABILITY    NOTE TO EMPLOYEE: You must promptly provide a copy of this report to your  employer or worker's compensation insurer, and Qualified Rehabilitation Consultant.    Date: 05/09/2019                 Employee Name: Aye Lazo         YOB: 1980  Medical Record Number: [id number]   Soc.Sec.No: [id number]  Employer: None                Date of Injury: 03/16/2019  Managed Care Organization / Insurance Company Name: Work Comp: [employer]           Diagnosis:     ICD-10-CM    1. Stiffness of neck M43.6    2. Muscle spasms of neck M62.838    3. Neck pain M54.2    4. Acute bilateral thoracic back pain M54.6    5. Acute bilateral low back pain without sciatica M54.5        Dates of Examination:   03/16/2019, 03/21/2019, 03/28/2019, 04/09/2019, 5/9/19  Work Related: yes     MMI: TBD  Permanent Partial Disability(PPD) likely: TBD    EMPLOYEE IS UNABLE TO WORK:   No work from 05/09/19 through to 6/9/19. Return to work date: TBD.   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   RESTRICTIONS IF ANY:  See above  TREATMENT PLAN/NOTES:   - Treatment: physical therapy (cotn exercises) and will add chiropractic,  rest, gentle range of motion exercises, ice.  - Medication:  tizandine, hydrocodone as needed, prednisone course - done  Please contact me with questions or concerns.     Thank you,               Artem Archibald M.D.

## 2019-05-09 NOTE — LETTER
REPORT OF WORK ABILITY    NOTE TO EMPLOYEE: You must promptly provide a copy of this report to your  employer or worker's compensation insurer, and Qualified Rehabilitation Consultant.    Date: 05/09/2019                 Employee Name: Aye Lazo         YOB: 1980  Medical Record Number: [id number]   Soc.Sec.No: [id number]  Employer: [employer]                Date of Injury: 03/16/2019  Managed Care Organization / Insurance Company Name: Work Comp: [employer]           Diagnosis:     ICD-10-CM    1. Stiffness of neck M43.6    2. Muscle spasms of neck M62.838    3. Neck pain M54.2    4. Acute bilateral thoracic back pain M54.6    5. Acute bilateral low back pain without sciatica M54.5        Dates of Examination:   03/16/2019, 03/21/2019, 03/28/2019, 04/09/2019, 5/9/19  Work Related: yes     MMI: TBD  Permanent Partial Disability(PPD) likely: TBD    EMPLOYEE IS UNABLE TO WORK:   No work from 05/09/19 through to 6/9/19. Return to work date: TBD.   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   RESTRICTIONS IF ANY:  See above  TREATMENT PLAN/NOTES:   - Treatment: physical therapy (cotn exercises) and will add chiropractic,  rest, gentle range of motion exercises, ice.  - Medication:  tizandine, hydrocodone as needed, prednisone course - done  Please contact me with questions or concerns.     Thank you,               Artem Archibald M.D.

## 2019-05-09 NOTE — PROGRESS NOTES
"  SUBJECTIVE:                                                      Aye Lazo is a 38 year old female who presents to clinic today for the following health issues:    Neck and Shoulder Follow Up & Work Comp  DOI: 03/16/2019  Status: Aye reports that her severe neck stiffness is worse, tender areas are more widespread in her upper back and neck, and her ROM is still reduced in multiple directions. She had completed her course of PT and was given stretches to do at home -- which she has been doing diligently. She comments that her muscle relaxers and heat/ice are helpful and she has been seeing chiropractics since a month ago for regular adjustments with little progress yet. She shows frustration with work comp as they have not been paying for her office visits and meds thus far. She continues to unable to twist, turn, or maneuver her neck to do her job properly and safely. This increased stress has caused her body to regress \"back to square one\". Her chiropractor also had done an XR which discovered twisting of her pelvis/SI joint.    Problem list and histories reviewed & adjusted, as indicated.  Additional history: as documented    ROS:  Constitutional, HEENT, cardiovascular, pulmonary, GI, , musculoskeletal, neuro, skin, endocrine and psych systems are negative, except as otherwise noted.  This document serves as a record of the services and decisions personally performed and made by Tru Archibald MD. It was created on his behalf by Brandon Johnson, a trained medical scribe. The creation of this document is based the provider's statements to the medical scribe.  Scribe Brandon Johnson 10:24 AM, May 9, 2019    OBJECTIVE:                                                    /82   Pulse 97   Temp 98  F (36.7  C) (Oral)   Resp 14   Ht 1.626 m (5' 4\")   Wt 55.3 kg (122 lb)   SpO2 98%   BMI 20.94 kg/m   Body mass index is 20.94 kg/m .   GENERAL: healthy, alert, well nourished, well hydrated, mild distress  HENT: " ear canals- normal; TMs- normal; Nose- normal; Mouth- no ulcers, no lesions  NECK: Suboccipital trigger point tenderness, paracervical muscles are tender left greater than right, significantly reduced rotational ROM (15 degrees to the left, 20 degrees to the right), severely reduced flexion and extension, tilt is 10 degrees to the left, and 15 to the right otherwise no adenopathy, no asymmetry, no masses; thyroid- normal to palpation  RESP: lungs clear to auscultation - no rales, no rhonchi, no wheezes  CV: regular rates and rhythm, normal S1 S2, no S3 or S4 and no murmur, no click or rub -  ABDOMEN: soft, no tenderness, no  hepatosplenomegaly, no masses, normal bowel sounds  MS: extremities- no gross deformities noted, no edema  SKIN: no suspicious lesions, no rashes  NEURO: strength and tone- normal, sensory exam- grossly normal, mentation- intact, speech- normal, reflexes- symmetric  BACK: Supraspinatus, moderate-severe bilateral rhomboid, and severe bilateral trapezius trigger point tenderness  no CVA tenderness, mild bilateral paralumbar tenderness    ASSESSMENT/PLAN:                                                    Aye was seen today for recheck.    Diagnoses and all orders for this visit:    Stiffness of neck/Muscle spasms of neck - ongoing and minimally changed since her fall.  Continue home physical therapy and chiropractic adjustments may help.  Briefly discussed dry needling as an option and she will consider that if the chiropractic does not make progress.   Continue heat and ice and meds prn.   -     tiZANidine (ZANAFLEX) 2 MG tablet; Take 1 tablet (2 mg) by mouth 3 times daily as needed for muscle spasms  -     HYDROcodone-acetaminophen (NORCO) 5-325 MG tablet; Take 1 tablet by mouth every 6 hours as needed for pain  -     tiZANidine (ZANAFLEX) 2 MG tablet; Take 1 tablet (2 mg) by mouth 3 times daily as needed for muscle spasms    Acute bilateral thoracic back pain / Acute bilateral low back  pain without sciatica  -     HYDROcodone-acetaminophen (NORCO) 5-325 MG tablet; Take 1 tablet by mouth every 6 hours as needed for pain  -     tiZANidine (ZANAFLEX) 2 MG tablet; Take 1 tablet (2 mg) by mouth 3 times daily as needed for muscle spasms    See Report of Work Ability  too    Risks, benefits and alternatives of treatments discussed. Plan agreed on.      Followup: Return in about 1 month (around 6/9/2019) for Follow-up.    See patient instructions.     The information in this document, created by the medical scribe for me, accurately reflects the services I personally performed and the decisions made by me. I have reviewed and approved this document for accuracy prior to leaving the patient care area.  10:24 AM, 05/09/19        Artem Archibald MD   Pager: [phone]

## 2019-05-15 ENCOUNTER — TELEPHONE (OUTPATIENT)
Dept: FAMILY MEDICINE | Facility: CLINIC | Age: 39
End: 2019-05-15

## 2019-05-15 NOTE — TELEPHONE ENCOUNTER
Patient walked into clinic with questions of Work Comp diagnosis (  Neck Pain ) is not a valid Diagnosis condition under [employer]. Please call pt @ [phone]  Ashley Eduardo LPN

## 2019-05-31 DIAGNOSIS — M54.50 ACUTE BILATERAL LOW BACK PAIN WITHOUT SCIATICA: ICD-10-CM

## 2019-05-31 DIAGNOSIS — M54.6 ACUTE BILATERAL THORACIC BACK PAIN: ICD-10-CM

## 2019-05-31 DIAGNOSIS — M54.2 NECK PAIN: ICD-10-CM

## 2019-05-31 NOTE — TELEPHONE ENCOUNTER
Controlled Substance Refill Request for HYDROcodone-acetaminophen (NORCO) 5-325 MG tablet  Problem List Complete:  Yes    Last Written Prescription Date:  5.14.19  Last Fill Quantity: 25 tablet,   # refills: 0      Last Office Visit with [facility] primary care provider: 5.9.19    Future Office visit:   Next 5 appointments (look out 90 days)    Jun 06, 2019 10:40 AM CDT  [facility] [address] [place] [id number]  [phone]          Controlled substance agreement:   Encounter-Level CSA:    There are no encounter-level csa.     Patient-Level CSA:    There are no patient-level csa.         Last Urine Drug Screen: No results found for: CDAUT, No results found for: COMDAT, No results found for: THC13, PCP13, COC13, MAMP13, OPI13, AMP13, BZO13, TCA13, MTD13, BAR13, OXY13, PPX13, BUP13     Processing:  Fax Rx to listed pharmacy    [email or web address]   checked in past 3 months?  No, route to RN

## 2019-06-03 RX ORDER — HYDROCODONE BITARTRATE AND ACETAMINOPHEN 5; 325 MG/1; MG/1
1 TABLET ORAL EVERY 6 HOURS PRN
Qty: 25 TABLET | Refills: 0 | Status: SHIPPED | OUTPATIENT
Start: 2019-06-03 | End: 2019-06-25

## 2019-06-03 NOTE — TELEPHONE ENCOUNTER
Prescription(s) signed and in the [facility].  Please process and notify the patient, if needed.

## 2019-06-06 ENCOUNTER — OFFICE VISIT (OUTPATIENT)
Dept: FAMILY MEDICINE | Facility: CLINIC | Age: 39
End: 2019-06-06
Payer: OTHER MISCELLANEOUS

## 2019-06-06 VITALS
DIASTOLIC BLOOD PRESSURE: 82 MMHG | SYSTOLIC BLOOD PRESSURE: 118 MMHG | BODY MASS INDEX: 20.83 KG/M2 | HEART RATE: 101 BPM | HEIGHT: 64 IN | TEMPERATURE: 98.8 F | OXYGEN SATURATION: 98 % | WEIGHT: 122 LBS

## 2019-06-06 DIAGNOSIS — M54.50 ACUTE BILATERAL LOW BACK PAIN WITHOUT SCIATICA: Primary | ICD-10-CM

## 2019-06-06 DIAGNOSIS — M54.6 ACUTE BILATERAL THORACIC BACK PAIN: ICD-10-CM

## 2019-06-06 DIAGNOSIS — M54.2 NECK PAIN: ICD-10-CM

## 2019-06-06 DIAGNOSIS — M70.62 TROCHANTERIC BURSITIS OF LEFT HIP: ICD-10-CM

## 2019-06-06 DIAGNOSIS — M62.838 MUSCLE SPASMS OF NECK: ICD-10-CM

## 2019-06-06 PROCEDURE — 99214 OFFICE O/P EST MOD 30 MIN: CPT | Performed by: FAMILY MEDICINE

## 2019-06-06 ASSESSMENT — MIFFLIN-ST. JEOR: SCORE: 1213.39

## 2019-06-06 NOTE — PROGRESS NOTES
"Subjective     Aye Lazo is a 38 year old female who presents to clinic today for the following health issues:    HPI   Neck , upper back, lower back  Follow Up (Work Comp)  DOI: 03/16/2019  Status: Nila reports that it's been fives weeks with the chiropractor who would like to continue the 6-8 week course and then another XR for re-evaluation. Her neck and back pain and reduced rotation of her neck remains the same with mild improvements but still unable to rotate her head beyond 20 degrees to either side. She notes that the process of adjustment has been painful. She also comments that her back pains are persistent. The previously noted lateral left hip and also SI pain are more prominent and would like to have it examined. She has been using heat application to the associated painful areas which have been giving mild relief. Denies headaches.  No radicular symptoms.    Reviewed and updated as needed this visit by provider:         Review of Systems   Constitutional, HEENT, cardiovascular, pulmonary, GI, , musculoskeletal, neuro, skin, endocrine and psych systems are negative, except as otherwise noted.  This document serves as a record of the services and decisions personally performed and made by Tru Archibald MD. It was created on his behalf by Brandon Johnson, a trained medical scribe. The creation of this document is based the provider's statements to the medical scribe.  Scribe Brandon Johnson 11:19 AM, June 6, 2019     Objective   /82   Pulse 101   Temp 98.8  F (37.1  C) (Oral)   Ht 1.626 m (5' 4\")   Wt 55.3 kg (122 lb)   SpO2 98%   BMI 20.94 kg/m   Body mass index is 20.94 kg/m .  Physical Exam   GENERAL: healthy, alert, well nourished, well hydrated, no distress  HENT: ear canals- normal; TMs- normal; Nose- normal; Mouth- no ulcers, no lesions  NECK: suboccipital, trapezius and rhomboid trigger point tenderness bilateral. Bilateral paracervical muscle tenderness as well. tenderness, no adenopathy, no " asymmetry, no masses, no stiffness; thyroid- normal to palpation  RESP: lungs clear to auscultation - no rales, no rhonchi, no wheezes  CV: regular rates and rhythm, normal S1 S2, no S3 or S4 and no murmur, no click or rub -  ABDOMEN: soft, no tenderness, no  hepatosplenomegaly, no masses, normal bowel sounds  MS: form of the hips but tender over the left lateral hip (trochanteric bursa region) and worse with stretching of the area. otherwise extremities- no gross deformities noted, no edema  SKIN: no suspicious lesions, no rashes  BACK: no CVA tenderness, bilateral  paralumbar tenderness      Assessment & Plan   Aye was seen today for recheck.    Diagnoses and all orders for this visit:    Acute bilateral low back pain without sciatica    Acute bilateral thoracic back pain    Muscle spasms of neck    Neck pain    Plan for above is to continue chiropractic treatments and home range of motion exercises.    Trochanteric bursitis of left hip - stretching and ice are helpful.  Consider steroid injection if needed.       Tobacco Cessation:   reports that she has been smoking.  She has a 9.50 pack-year smoking history. She has never used smokeless tobacco.    See Patient Instructions    Return in about 3 weeks (around 6/27/2019) for recheck.    The information in this document, created by the medical scribe for me, accurately reflects the services I personally performed and the decisions made by me. I have reviewed and approved this document for accuracy prior to leaving the patient care area.  11:13 AM, 06/06/19        Artem Archibald MD   Pager - [phone]  [facility]

## 2019-06-06 NOTE — LETTER
REPORT OF WORK ABILITY    NOTE TO EMPLOYEE: You must promptly provide a copy of this report to your  employer or worker's compensation insurer, and Qualified Rehabilitation Consultant.    Date: 06/06/2019                 Employee Name: Aye Lazo         YOB: 1980  Medical Record Number: [id number]   Soc.Sec.No: [id number]  Employer: [employer]                Date of Injury: 03/16/2019  Managed Care Organization / Insurance Company Name: Work Comp: [employer]           Diagnosis:     ICD-10-CM    1. Acute bilateral low back pain without sciatica M54.5    2. Acute bilateral thoracic back pain M54.6    3. Muscle spasms of neck M62.838    4. Neck pain M54.2    5. Trochanteric bursitis of left hip M70.62        Dates of Examination:   03/16/2019, 03/21/2019, 03/28/2019, 04/09/2019, 5/9/19, 6/6/2019  Work Related: yes     MMI: TBD  Permanent Partial Disability(PPD) likely: TBD    EMPLOYEE IS UNABLE TO WORK:   No work from 06/06/19 through to 7/2/2019. Return to work date: TBD.   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   RESTRICTIONS IF ANY:  See above  TREATMENT PLAN/NOTES:   - Treatment: physical therapy (continue home exercises) and chiropractic,  rest, gentle range of motion exercises, ice or heat.  - Medication:  tizandine, hydrocodone as needed,  Please contact me with questions or concerns.     Thank you,               Artem Archibald M.D.

## 2019-06-06 NOTE — PATIENT INSTRUCTIONS
Patient Education     Understanding Trochanteric Bursitis    A bursa is a thin, slippery, sac-like film. It contains a small amount of fluid. This structure is found between bones and soft tissues in and around joints. A bursa cushions and protects a joint. It keeps parts of a joint from rubbing against each other. If a bursa becomes inflamed and irritated, it is known as bursitis.  The trochanteric bursa is found on the hip joint. It lies on top of the bump at the top of the thighbone called the greater trochanter. Inflammation of this bursa is called trochanteric bursitis.     How to say it  [id number]   Causes of trochanteric bursitis  Causes may include:    Overuse of the hip during running or other sports, dance, or work    Falling on or irritation to the side of the hip  This condition may occur along with other problems, such as osteoarthritis of the hip or knee, or low back problems. In rare cases, it may occur after hip surgery.  Symptoms of trochanteric bursitis    Pain or aching on the side of the hip. The pain may travel down the leg.    Swelling, tenderness, or warmth on the side of the hip at the bony bump at the top of the thigh  Treatment for trochanteric bursitis  These may include:    Resting the hip. This allows the bursa to heal.    Prescription or over-the-counter pain medicines. These help reduce inflammation, swelling, and pain.    Cold packs and heat packs. These help reduce pain and swelling.    Stretching and strengthening exercises. These improve flexibility and strength around the hip.    Physical therapy. This includes exercises or other treatments.    Injections of medicine into the bursa. This may help reduce inflammation and relieve symptoms.  Possible complications  If you don t give your hip time to heal, the problem may not go away, may return, or may get worse. Rest and treat your hip as directed.     When to call your healthcare provider  Call your healthcare provider  right away if you have any of these:    Fever of 100.4 F (38 C) or higher, or as directed    Redness, swelling, or warmth that gets worse    Symptoms that don t get better with prescribed medicines, or get worse    New symptoms   Date Last Reviewed: 3/29/2016    5824-3372 [employer]. [address] [place] [address]. All rights reserved. This information is not intended as a substitute for professional medical care. Always follow your healthcare professional's instructions.           Patient Education     Iliotibial Band Stretch (Flexibility)    1. Stand next to a chair. Hold onto the chair with your right hand for support. Cross your right leg behind your left leg.  2. Lean your right hip toward the right. Feel the stretch at the outside of your hip.  3. Hold for 30 to 60 seconds. Then relax.  4. Repeat 2 times, or as instructed.  5. Switch sides and repeat.  6. Do this 3 times a day, or as instructed.     Tip: Don t bend forward or twist at the waist.   Date Last Reviewed: 3/29/2016    2458-0446 [employer]. [address] [place] [address]. All rights reserved. This information is not intended as a substitute for professional medical care. Always follow your healthcare professional's instructions.           Patient Education     Side Lying Hip Abduction (Strength)    1. Lie down on the floor on your side. Rest your head on your arm. Bend your legs at the knees.  2. Keep your feet together and lift your top leg up so that your knees are . Keep your hips steady.     3. Slowly lower your leg back down.  4. Repeat 10 times, or as instructed.  5. Switch sides if instructed.     Challenge yourself  Put an elastic band or tubing around your thighs. Raise and lower your top leg slowly and steadily.      Date Last Reviewed: 3/29/2016    5121-3335 [employer]. [address] [place] [address]. All rights reserved. This information is not  intended as a substitute for professional medical care. Always follow your healthcare professional's instructions.

## 2019-06-24 DIAGNOSIS — M54.2 NECK PAIN: ICD-10-CM

## 2019-06-24 DIAGNOSIS — M54.50 ACUTE BILATERAL LOW BACK PAIN WITHOUT SCIATICA: ICD-10-CM

## 2019-06-24 DIAGNOSIS — M54.6 ACUTE BILATERAL THORACIC BACK PAIN: ICD-10-CM

## 2019-06-24 NOTE — TELEPHONE ENCOUNTER
Controlled Substance Refill Request for HYDROcodone-acetaminophen (NORCO) 5-325 MG tablet  Problem List Complete:  Yes    Last Written Prescription Date:  6.3.19  Last Fill Quantity: 25 tablet,   # refills: 0      Last Office Visit with [facility] primary care provider: 6.6.19    Future Office visit:   Next 5 appointments (look out 90 days)    Jul 02, 2019  9:40 AM CDT  Office Visit with Tru Archibald MD  [facility] [address] [place] [id number]  [phone]          Controlled substance agreement:   Encounter-Level CSA:    There are no encounter-level csa.     Patient-Level CSA:    There are no patient-level csa.         Last Urine Drug Screen: No results found for: CDAUT, No results found for: COMDAT, No results found for: THC13, PCP13, COC13, MAMP13, OPI13, AMP13, BZO13, TCA13, MTD13, BAR13, OXY13, PPX13, BUP13     Processing:  Fax Rx to listed pharmacy    [email or web address]   checked in past 3 months?  No, route to RN

## 2019-06-25 RX ORDER — HYDROCODONE BITARTRATE AND ACETAMINOPHEN 5; 325 MG/1; MG/1
1 TABLET ORAL EVERY 6 HOURS PRN
Qty: 25 TABLET | Refills: 0 | Status: SHIPPED | OUTPATIENT
Start: 2019-06-25 | End: 2019-08-22

## 2019-06-25 NOTE — TELEPHONE ENCOUNTER
Prescription(s) signed and in the [facility].  Please process and notify the patient, if needed.

## 2019-07-02 ENCOUNTER — OFFICE VISIT (OUTPATIENT)
Dept: FAMILY MEDICINE | Facility: CLINIC | Age: 39
End: 2019-07-02
Payer: OTHER MISCELLANEOUS

## 2019-07-02 VITALS
SYSTOLIC BLOOD PRESSURE: 122 MMHG | HEIGHT: 64 IN | DIASTOLIC BLOOD PRESSURE: 80 MMHG | TEMPERATURE: 98.8 F | HEART RATE: 97 BPM | OXYGEN SATURATION: 98 % | BODY MASS INDEX: 20.83 KG/M2 | WEIGHT: 122 LBS

## 2019-07-02 DIAGNOSIS — M54.50 ACUTE BILATERAL LOW BACK PAIN WITHOUT SCIATICA: ICD-10-CM

## 2019-07-02 DIAGNOSIS — M70.62 TROCHANTERIC BURSITIS OF LEFT HIP: ICD-10-CM

## 2019-07-02 DIAGNOSIS — M43.6 STIFFNESS OF NECK: ICD-10-CM

## 2019-07-02 DIAGNOSIS — M54.6 ACUTE BILATERAL THORACIC BACK PAIN: Primary | ICD-10-CM

## 2019-07-02 DIAGNOSIS — M62.838 MUSCLE SPASMS OF NECK: ICD-10-CM

## 2019-07-02 PROCEDURE — 20610 DRAIN/INJ JOINT/BURSA W/O US: CPT | Mod: LT | Performed by: FAMILY MEDICINE

## 2019-07-02 PROCEDURE — 99214 OFFICE O/P EST MOD 30 MIN: CPT | Mod: 25 | Performed by: FAMILY MEDICINE

## 2019-07-02 ASSESSMENT — MIFFLIN-ST. JEOR: SCORE: 1213.39

## 2019-07-02 NOTE — PROGRESS NOTES
"Subjective     Aye Lazo is a 38 year old female who presents to clinic today for the following health issues:    HPI   Neck, Upper & Lower Back Pain (Work Comp)  DOI: 03/16/19  Last Office Visit: 06/06/2019  Nila reports that her symptoms are unchanged with continued soreness and tightness in her neck, upper back, and lower back. She symptomatically uses ibuprofen and uses Norco bid on most days. Her chiropractor did a reassessment four days ago which includes XRs. She will be going to the chiropractor today to follow up with the results. who did XRs, Symptoms become worse while raining. She notes that her adjustments have granted temporary relief for her symptoms. Denies new symptoms, or headaches.    Trochanteric Bursitis   Nila also mentions that her left hip bursitis has persisted since the fall despite home stretching.  Worse with prolonged sitting.     Reviewed and updated as needed this visit by provider:  Tobacco  Allergies  Meds  Problems  Med Hx  Surg Hx  Fam Hx         Review of Systems   Constitutional, HEENT, cardiovascular, pulmonary, GI, , musculoskeletal, neuro, skin, endocrine and psych systems are negative, except as otherwise noted.  This document serves as a record of the services and decisions personally performed and made by Tru Archibald MD. It was created on his behalf by Brandon Johnson, a trained medical scribe. The creation of this document is based the provider's statements to the medical scribe.  Scribyury Johnson 10:11 AM, July 2, 2019    Objective   /80   Pulse 97   Temp 98.8  F (37.1  C) (Oral)   Ht 1.626 m (5' 4\")   Wt 55.3 kg (122 lb)   SpO2 98%   BMI 20.94 kg/m   Body mass index is 20.94 kg/m .  Physical Exam   GENERAL: healthy, alert, well nourished, well hydrated, no distress  HENT: ear canals- normal; TMs- normal; Nose- normal; Mouth- no ulcers, no lesions  NECK: suboccipital, bilateral trapezius and rhomboid trigger point tenderness and reduced ROM in  All " directions, no adenopathy, no asymmetry, no masses, no stiffness; thyroid- normal to palpation  RESP: lungs clear to auscultation - no rales, no rhonchi, no wheezes  CV: regular rates and rhythm, normal S1 S2, no S3 or S4 and no murmur, no click or rub -  ABDOMEN: soft, no tenderness, no  hepatosplenomegaly, no masses, normal bowel sounds  MS: left lateral hip tenderness over the trochanteric bursa region, otherwise extremities- no gross deformities noted, no edema  SKIN: no suspicious lesions, no rashes  NEURO: strength and tone- normal, sensory exam- grossly normal, mentation- intact, speech- normal, reflexes- symmetric  BACK: no CVA tenderness, no paralumbar tenderness    Procedure Note:  After consent was obtained, using sterile technique the right lateral hip was prepped and 4cc's of 1% plain Lidocaine and 40 mg of triamcinolone  was then injected and the needle withdrawn.  Her symptoms improved initially after the injection. The procedure was well tolerated.  The patient is asked to continue to rest the leg for a few more days before resuming regular activities.  It may be more painful for the first 1-2 days.  Watch for fever, or increased swelling or persistent pain in knee. Call or return to clinic prn if such symptoms occur or the pain fails to improve as anticipated.    Assessment & Plan   Aye was seen today for recheck.    Diagnoses and all orders for this visit:    Acute bilateral thoracic back pain  Acute bilateral low back pain without sciatica  Muscle spasms of neck  Stiffness of neck  All ongoing and slight improvement in neck range of motion  -     [facility] HAND, AND CHIROPRACTIC REFERRAL for dry needling if indicated for her muscle spasms of the upper back and neck region.   Continue chiropractic if helpful.     Trochanteric bursitis of left hip - Ongoing pain despite home stretching the last month.  Continue stretching- ice prn,  Steroid injection was done.   -     DRAIN/INJECT LARGE  JOINT/BURSA      Dry needling therapy referral given.    Tobacco Cessation:   reports that she has been smoking.  She has a 9.50 pack-year smoking history. She has never used smokeless tobacco.    See Patient Instructions    Return in about 1 month (around 8/2/2019).          Artem Archibald MD   Pager - [phone]  [facility] [place]

## 2019-07-02 NOTE — LETTER
REPORT OF WORK ABILITY    NOTE TO EMPLOYEE: You must promptly provide a copy of this report to your  employer or worker's compensation insurer, and Qualified Rehabilitation Consultant.    Date: 07/02/2019                 Employee Name: Aye Lazo         YOB: 1980  Medical Record Number: [id number]   Soc.Sec.No: [id number]  Employer: [employer]                Date of Injury: 03/16/2019  Managed Care Organization / Insurance Company Name: Work Comp: [employer]           Diagnosis:     ICD-10-CM    1. Acute bilateral low back pain without sciatica M54.5    2. Acute bilateral thoracic back pain M54.6    3. Muscle spasms of neck M62.838    4. Neck pain M54.2    5. Trochanteric bursitis of left hip M70.62        Dates of Examination:   03/16/2019, 03/21/2019, 03/28/2019, 04/09/2019, 5/9/19, 6/6/2019, 7/2/19  Work Related: yes     MMI: TBD  Permanent Partial Disability(PPD) likely: TBD    EMPLOYEE IS UNABLE TO WORK:   No work from 07/02/19 through to 8/2/2019. Return to work date: TBD.   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   RESTRICTIONS IF ANY:  See above  TREATMENT PLAN/NOTES:   - Treatment: physical therapy (continue home exercises) and will refer for dry needling (if indicated)  and chiropractic,  rest, gentle range of motion exercises, ice or heat.  - Medication:  tizandine, hydrocodone as needed will wean off  Please contact me with questions or concerns.     Thank you,               Artem Archibald M.D.

## 2019-07-11 ENCOUNTER — THERAPY VISIT (OUTPATIENT)
Dept: PHYSICAL THERAPY | Facility: CLINIC | Age: 39
End: 2019-07-11
Payer: OTHER MISCELLANEOUS

## 2019-07-11 DIAGNOSIS — M43.6 STIFFNESS OF NECK: ICD-10-CM

## 2019-07-11 DIAGNOSIS — M62.838 MUSCLE SPASMS OF NECK: ICD-10-CM

## 2019-07-11 DIAGNOSIS — M54.50 ACUTE BILATERAL LOW BACK PAIN WITHOUT SCIATICA: Primary | ICD-10-CM

## 2019-07-11 DIAGNOSIS — M54.6 ACUTE BILATERAL THORACIC BACK PAIN: ICD-10-CM

## 2019-07-11 PROCEDURE — 99207 C STAT DRY NEEDLING - IAM: CPT | Mod: GP | Performed by: PHYSICAL THERAPIST

## 2019-07-11 PROCEDURE — 97161 PT EVAL LOW COMPLEX 20 MIN: CPT | Mod: GP | Performed by: PHYSICAL THERAPIST

## 2019-07-11 NOTE — PROGRESS NOTES
[facility] Initial Evaluation  Aye Lazo is a 38 year old  female referred to physical therapy by Dr. Archibald for treatment of neck/back pain with Precautions/Restrictions/MD instructions eval and treat     Physical Therapy Initial Evaluation: Subjective History      Injury/Condition Details:  Presenting Complaint Neck and back pain   Onset Timing/Date 3/16/19   Mechanism Fall on ice while walking at work as a       Symptom Behavior Details    Primary Pain Symptoms Location: Left greater than right neck and back pain  Quality: ache sometimes sharp   Frequency: Constant   Worst Pain 8/10   Best Pain 3/10   Symptom Provocators Driving, lifting, sleeping, prolonged positioning   Symptom Relievers Changing positions, activity avoidance   Time of day dependent? Worse during the day   Recent symptom change? Small improvement since onset      Prior Testing/Intervention for current condition:  Prior Tests Xray of thoracic spine on 3/16/19 indicating:  IMPRESSION: Negative, osseous structures appear intact. No fractures are identified.    X-ray of cervical spine on 3/16/19 indicating:  IMPRESSION: No fractures are identified.   Prior Treatment PT and chiropractic care following the injury with some benefit      Lifestyle & General Medical History:  General Health Reported by Patient good   Employment    Orthopaedic history None   Notable medical history Depression     HPI                    Objective:  Standing Alignment:    Cervical/Thoracic:  Forward head  Shoulder/UE:  Rounded shoulders                             Lumbar/SI Evaluation  ROM:    AROM Lumbar:   Flexion:          90%, mild pain  Ext:                    100%, painfree   Side Bend:        Left:  75%, mild pain    Right:  75%, mild pain  Rotation:           Left:     Right:   Side Glide:        Left:     Right:           Lumbar Myotomes:  normal                    Lumbar Palpation:  Palpation (lumbar): + lumbar  multifidi.  Tenderness present at Left:    Erector Spinae  Tenderness present at Right: Erector Spinae      Spinal Segmental Conclusions:     Level: Hypo noted at L3, L4 and L5           Cervical/Thoracic Evaluation    AROM:  AROM Cervical:    Flexion:            33%, moderate pain  Extension:       25%, mild pain  Rotation:         Left: 25%, mild pain     Right: 25%, mild pain  Side Bend:      Left: 25%, moderate pain     Right:  25%, moderate pain        Cervical Myotomes:  normal                        Cervical Palpation:    Tenderness present at Left:    Upper Trap; Levator and Suboccipitals  Tenderness present at Right:    Upper Trap; Levator and Suboccipitals      Spinal Segmental Conclusions:    Level:  Hypo at C3, C2, C4, C5, C6, C7, T1 and T2             Shoulder Evaluation:  ROM:          Strength:    Flexion: Left:4+/5   Pain:    Right: 5/5     Pain:     Abduction:  Left: 4+/5  Pain:    Right: 5/5     Pain:    Internal Rotation:  Left:5/5     Pain:    Right: 5/5     Pain:  External Rotation:   Left:4+/5     Pain:   Right:5/5     Pain:                                                     General     ROS    Assessment/Plan:    Patient is a 38 year old female with cervical and lumbar complaints.    Patient has the following significant findings with corresponding treatment plan.                Diagnosis 1:  Neck pain  Pain -  manual therapy, splint/taping/bracing/orthotics, self management, education, directional preference exercise and home program  Decreased ROM/flexibility - manual therapy, therapeutic exercise, therapeutic activity and home program  Decreased joint mobility - manual therapy, therapeutic exercise, therapeutic activity and home program  Decreased strength - therapeutic exercise, therapeutic activities and home program  Decreased proprioception - neuro re-education, therapeutic activities and home program  Impaired muscle performance - neuro re-education and home program  Diagnosis 2:  Low  back pain   Pain -  manual therapy, self management, education, directional preference exercise and home program  Decreased ROM/flexibility - manual therapy, therapeutic exercise, therapeutic activity and home program  Impaired muscle performance - neuro re-education and home program    Therapy Evaluation Codes:   1) History comprised of:   Personal factors that impact the plan of care:      None.    Comorbidity factors that impact the plan of care are:      Depression.     Medications impacting care: Anti-depressant.  2) Examination of Body Systems comprised of:   Body structures and functions that impact the plan of care:      Cervical spine.   Activity limitations that impact the plan of care are:      Driving, Lifting, Sitting, Standing, Walking and Working.  3) Clinical presentation characteristics are:   Stable/Uncomplicated.  4) Decision-Making    Low complexity using standardized patient assessment instrument and/or measureable assessment of functional outcome.  Cumulative Therapy Evaluation is: Low complexity.    Previous and current functional limitations:  (See Goal Flow Sheet for this information)    Short term and Long term goals: (See Goal Flow Sheet for this information)     Communication ability:  Patient appears to be able to clearly communicate and understand verbal and written communication and follow directions correctly.  Treatment Explanation - The following has been discussed with the patient:   RX ordered/plan of care  Anticipated outcomes  Possible risks and side effects  This patient would benefit from PT intervention to resume normal activities.   Rehab potential is good.    Frequency:  1 X week, once daily  Duration:  for 6 weeks  Discharge Plan:  Achieve all LTG.  Independent in home treatment program.  Reach maximal therapeutic benefit.    Please refer to the daily flowsheet for treatment today, total treatment time and time spent performing 1:1 timed codes.

## 2019-07-18 ENCOUNTER — THERAPY VISIT (OUTPATIENT)
Dept: PHYSICAL THERAPY | Facility: CLINIC | Age: 39
End: 2019-07-18
Payer: OTHER MISCELLANEOUS

## 2019-07-18 DIAGNOSIS — M54.2 NECK PAIN: ICD-10-CM

## 2019-07-18 DIAGNOSIS — M54.50 ACUTE BILATERAL LOW BACK PAIN WITHOUT SCIATICA: ICD-10-CM

## 2019-07-18 PROCEDURE — 99207 C STAT DRY NEEDLING - IAM: CPT | Mod: 25 | Performed by: PHYSICAL THERAPIST

## 2019-07-18 PROCEDURE — 97032 APPL MODALITY 1+ESTIM EA 15: CPT | Mod: GP | Performed by: PHYSICAL THERAPIST

## 2019-07-18 PROCEDURE — 97110 THERAPEUTIC EXERCISES: CPT | Mod: GP | Performed by: PHYSICAL THERAPIST

## 2019-07-26 ENCOUNTER — TELEPHONE (OUTPATIENT)
Dept: FAMILY MEDICINE | Facility: CLINIC | Age: 39
End: 2019-07-26

## 2019-07-26 NOTE — TELEPHONE ENCOUNTER
Tried to call patient to set up appointment & mailbox was full.  Nahomy Miller, Clinic Receptionist

## 2019-07-26 NOTE — TELEPHONE ENCOUNTER
Reason for Call:  Other appointment    Detailed comments: Pt need wc recheck to return to work Aug 2nd or before    Phone Number Patient can be reached at: Home number on file [phone] (home)    Best Time:Anytime    Can we leave a detailed message on this number? YES    Call taken on 7/26/2019 at 9:16 AM by Mindy Hoang

## 2019-08-01 ENCOUNTER — OFFICE VISIT (OUTPATIENT)
Dept: FAMILY MEDICINE | Facility: CLINIC | Age: 39
End: 2019-08-01
Payer: OTHER MISCELLANEOUS

## 2019-08-01 ENCOUNTER — THERAPY VISIT (OUTPATIENT)
Dept: PHYSICAL THERAPY | Facility: CLINIC | Age: 39
End: 2019-08-01
Payer: OTHER MISCELLANEOUS

## 2019-08-01 VITALS
TEMPERATURE: 98.8 F | BODY MASS INDEX: 20.83 KG/M2 | WEIGHT: 122 LBS | DIASTOLIC BLOOD PRESSURE: 80 MMHG | HEIGHT: 64 IN | SYSTOLIC BLOOD PRESSURE: 122 MMHG | OXYGEN SATURATION: 98 % | HEART RATE: 97 BPM

## 2019-08-01 DIAGNOSIS — M54.2 NECK PAIN: ICD-10-CM

## 2019-08-01 DIAGNOSIS — M62.838 MUSCLE SPASMS OF NECK: Primary | ICD-10-CM

## 2019-08-01 DIAGNOSIS — M54.50 ACUTE BILATERAL LOW BACK PAIN WITHOUT SCIATICA: ICD-10-CM

## 2019-08-01 DIAGNOSIS — M43.6 STIFFNESS OF NECK: ICD-10-CM

## 2019-08-01 DIAGNOSIS — M54.6 ACUTE BILATERAL THORACIC BACK PAIN: ICD-10-CM

## 2019-08-01 PROCEDURE — 99207 C STAT DRY NEEDLING - IAM: CPT | Mod: 25 | Performed by: PHYSICAL THERAPIST

## 2019-08-01 PROCEDURE — 99213 OFFICE O/P EST LOW 20 MIN: CPT | Performed by: FAMILY MEDICINE

## 2019-08-01 PROCEDURE — 97032 APPL MODALITY 1+ESTIM EA 15: CPT | Mod: GP | Performed by: PHYSICAL THERAPIST

## 2019-08-01 ASSESSMENT — MIFFLIN-ST. JEOR: SCORE: 1213.39

## 2019-08-01 NOTE — PROGRESS NOTES
"Subjective     Aye Lazo is a 38 year old female who presents to clinic today for the following health issues:    HPI   Neck, Upper & Lower Back Pain (Work Comp)  [employer]  DOI: 03/16/19  LOV: 07/02/2019  Nila reports that she's been doing the dry-needling as recommended. She notes that the first session was \"brutal\" and was to be expected, subsequent sessions (3 thus far) have been going well and she is content with the relief given and would like to continue the treatment. Her overall ROM of her neck and lower back pain has been \"slowly but surely\" improving. She's been doing ROM motion stretches at home daily. She continues to see the chiropractor for routine adjustments -- she doesn't believe her visits are complicating her ongoing symptoms. She inquiries about returning to work. Her previous trochanteric bursitis has been improved with cortisone shot.     Reviewed and updated as needed this visit by provider:         Review of Systems   Constitutional, HEENT, cardiovascular, pulmonary, GI, , musculoskeletal, neuro, skin, endocrine and psych systems are negative, except as otherwise noted.  This document serves as a record of the services and decisions personally performed and made by Tru Archibald MD. It was created on his behalf by Brandon Johnson, a trained medical scribe. The creation of this document is based the provider's statements to the medical scribe.  Scribe Brandon Johnson 9:31 AM, August 1, 2019    Objective   /80   Pulse 97   Temp 98.8  F (37.1  C) (Oral)   Ht 1.626 m (5' 4\")   Wt 55.3 kg (122 lb)   SpO2 98%   BMI 20.94 kg/m   Body mass index is 20.94 kg/m .  Physical Exam   GENERAL: healthy, alert, well nourished, well hydrated, no distress  HENT: ear canals- normal; TMs- normal; Nose- normal; Mouth- no ulcers, no lesions  NECK: mild suboccipital, bilateral trapezius and rhomboid stiffness and tenderness, reduced ROM with rotation however with notable  Small improvement, reduced but improving " flexion and extension, otherwise no tenderness, no adenopathy, no asymmetry, no masses, no stiffness; thyroid- normal to palpation  RESP: lungs clear to auscultation - no rales, no rhonchi, no wheezes  CV: regular rates and rhythm, normal S1 S2, no S3 or S4 and no murmur, no click or rub -  ABDOMEN: soft, no tenderness, no  hepatosplenomegaly, no masses, normal bowel sounds  MS: extremities- no gross deformities noted, no edema  SKIN: no suspicious lesions, no rashes  BACK: no CVA tenderness, no paralumbar tenderness  NEURO: strength and tone- normal, sensory exam- grossly normal, mentation- intact, speech- normal, reflexes- symmetric        Assessment & Plan   Aye was seen today for recheck.    Diagnoses and all orders for this visit:    Muscle spasms of neck  Neck pain  Stiffness of neck  Acute bilateral low back pain without sciatica  Acute bilateral thoracic back pain  Neck ROM with notable improvements -- dry needling has been helpful and encouraged to continue regular sessions. Recommended to continue home stretches and to consider taking Aleve (2 tablets bid) or Ibuprofen (3 tablets tid) for 5-7 days with food or water.     Tobacco Cessation:   reports that she has been smoking.  She has a 9.50 pack-year smoking history. She has never used smokeless tobacco.    See Patient Instructions    Return in about 2 months (around 10/1/2019) for Recheck.     The information in this document, created by the medical scribe for me, accurately reflects the services I personally performed and the decisions made by me. I have reviewed and approved this document for accuracy prior to leaving the patient care area.  9:45 AM, 08/01/19          Artem Archibald MD   Pager - [phone]  [facility] [place]

## 2019-08-01 NOTE — LETTER
REPORT OF WORK ABILITY    NOTE TO EMPLOYEE: You must promptly provide a copy of this report to your  employer or worker's compensation insurer, and Qualified Rehabilitation Consultant.    Date: 08/01/2019                 Employee Name: Aye Lazo         YOB: 1980  Medical Record Number: [id number]   Soc.Sec.No: [id number]  Employer: [employer]                Date of Injury: 03/16/2019  Managed Care Organization / Insurance Company Name: Work Comp: [employer]           Diagnosis:     ICD-10-CM    1. Muscle spasms of neck M62.838    2. Neck pain M54.2    3. Stiffness of neck M43.6    4. Acute bilateral low back pain without sciatica M54.5    5. Acute bilateral thoracic back pain M54.6        Dates of Examination:   03/16/2019, 03/21/2019, 03/28/2019, 04/09/2019, 5/9/19, 6/6/2019, 7/2/19, 8/1/2019  Work Related: yes     MMI: TBD  Permanent Partial Disability(PPD) likely: TBD    EMPLOYEE IS UNABLE TO WORK:   No work from 07/02/19 through to 8/2/2019. Return to work date: TBD.   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   RESTRICTIONS IF ANY:  See above  TREATMENT PLAN/NOTES:   - Treatment: physical therapy (continue home exercises) and will continue dry needling and chiropractic,  rest, gentle range of motion exercises, ice or heat.  - Medication:  Occasional ibuprofen as needed  Please contact me with questions or concerns.     Thank you,               Artem Archibald M.D.

## 2019-08-01 NOTE — LETTER
REPORT OF WORK ABILITY    NOTE TO EMPLOYEE: You must promptly provide a copy of this report to your  employer or worker's compensation insurer, and Qualified Rehabilitation Consultant.    Date: 08/01/2019                 Employee Name: Aye Lazo         YOB: 1980  Medical Record Number: [id number]   Soc.Sec.No: [id number]  Employer: [employer]                Date of Injury: 03/16/2019  Managed Care Organization / Insurance Company Name: Work Comp: [employer]       Diagnosis:     ICD-10-CM    1. Muscle spasms of neck M62.838    2. Neck pain M54.2    3. Stiffness of neck M43.6    4. Acute bilateral low back pain without sciatica M54.5    5. Acute bilateral thoracic back pain M54.6      Dates of Examination:   03/16/2019, 03/21/2019, 03/28/2019, 04/09/2019, 5/9/19, 6/6/2019, 7/2/19, 8/1/2019  Work Related: yes     MMI: TBD  Permanent Partial Disability(PPD) likely: TBD    EMPLOYEE IS UNABLE TO WORK:   No work from 08/01/2019 through to 8/22/2019. Return to work date: possibly 8/23/2019 with a reduced schedule and workload for the first 2-3 weeks.   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   RESTRICTIONS IF ANY:  See above  TREATMENT PLAN/NOTES:   - Treatment: physical therapy (continue home exercises) and will continue dry needling and chiropractic,  rest, gentle range of motion exercises, ice or heat.  - Medication:  Occasional ibuprofen as needed  Please contact me with questions or concerns.     Thank you,               Artem Archibald M.D.

## 2019-08-01 NOTE — LETTER
REPORT OF WORK ABILITY    NOTE TO EMPLOYEE: You must promptly provide a copy of this report to your  employer or worker's compensation insurer, and Qualified Rehabilitation Consultant.    Date: 08/01/2019                 Employee Name: Aye Lazo         YOB: 1980  Medical Record Number: [id number]   Soc.Sec.No: [id number]  Employer: [employer]                Date of Injury: 03/16/2019  Managed Care Organization / Insurance Company Name: Work Comp: [employer]           Diagnosis:     ICD-10-CM    1. Muscle spasms of neck M62.838    2. Neck pain M54.2    3. Stiffness of neck M43.6    4. Acute bilateral low back pain without sciatica M54.5    5. Acute bilateral thoracic back pain M54.6        Dates of Examination:   03/16/2019, 03/21/2019, 03/28/2019, 04/09/2019, 5/9/19, 6/6/2019, 7/2/19, 8/1/2019  Work Related: yes     MMI: TBD  Permanent Partial Disability(PPD) likely: TBD    EMPLOYEE IS UNABLE TO WORK:   No work from 08/01/2019 through to 8/22/2019. Return to work date: possibly 8/23/2019 with a reduced schedule and workload for the first 2-3 weeks.   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   RESTRICTIONS IF ANY:  See above  TREATMENT PLAN/NOTES:   - Treatment: physical therapy (continue home exercises) and will continue dry needling and chiropractic,  rest, gentle range of motion exercises, ice or heat.  - Medication:  Occasional ibuprofen as needed  Please contact me with questions or concerns.     Thank you,               Artem Archibald M.D.

## 2019-08-13 ENCOUNTER — THERAPY VISIT (OUTPATIENT)
Dept: PHYSICAL THERAPY | Facility: CLINIC | Age: 39
End: 2019-08-13
Payer: OTHER MISCELLANEOUS

## 2019-08-13 DIAGNOSIS — M54.50 ACUTE BILATERAL LOW BACK PAIN WITHOUT SCIATICA: ICD-10-CM

## 2019-08-13 DIAGNOSIS — M54.2 NECK PAIN: ICD-10-CM

## 2019-08-13 PROCEDURE — 97032 APPL MODALITY 1+ESTIM EA 15: CPT | Mod: GP | Performed by: PHYSICAL THERAPIST

## 2019-08-13 PROCEDURE — 99207 C STAT DRY NEEDLING - IAM: CPT | Mod: 25 | Performed by: PHYSICAL THERAPIST

## 2019-08-22 ENCOUNTER — OFFICE VISIT (OUTPATIENT)
Dept: FAMILY MEDICINE | Facility: CLINIC | Age: 39
End: 2019-08-22
Payer: OTHER MISCELLANEOUS

## 2019-08-22 ENCOUNTER — THERAPY VISIT (OUTPATIENT)
Dept: PHYSICAL THERAPY | Facility: CLINIC | Age: 39
End: 2019-08-22
Payer: OTHER MISCELLANEOUS

## 2019-08-22 VITALS
OXYGEN SATURATION: 98 % | TEMPERATURE: 98.8 F | HEIGHT: 64 IN | WEIGHT: 122 LBS | SYSTOLIC BLOOD PRESSURE: 118 MMHG | HEART RATE: 97 BPM | DIASTOLIC BLOOD PRESSURE: 80 MMHG | BODY MASS INDEX: 20.83 KG/M2

## 2019-08-22 DIAGNOSIS — M54.2 NECK PAIN: ICD-10-CM

## 2019-08-22 DIAGNOSIS — M54.50 ACUTE BILATERAL LOW BACK PAIN WITHOUT SCIATICA: ICD-10-CM

## 2019-08-22 DIAGNOSIS — M62.838 MUSCLE SPASMS OF NECK: ICD-10-CM

## 2019-08-22 DIAGNOSIS — M43.6 STIFFNESS OF NECK: ICD-10-CM

## 2019-08-22 DIAGNOSIS — M54.2 NECK PAIN: Primary | ICD-10-CM

## 2019-08-22 PROCEDURE — 97112 NEUROMUSCULAR REEDUCATION: CPT | Mod: GP | Performed by: PHYSICAL THERAPIST

## 2019-08-22 PROCEDURE — 99207 C STAT DRY NEEDLING - IAM: CPT | Mod: 25 | Performed by: PHYSICAL THERAPIST

## 2019-08-22 PROCEDURE — 97110 THERAPEUTIC EXERCISES: CPT | Mod: GP | Performed by: PHYSICAL THERAPIST

## 2019-08-22 PROCEDURE — 99214 OFFICE O/P EST MOD 30 MIN: CPT | Performed by: FAMILY MEDICINE

## 2019-08-22 ASSESSMENT — MIFFLIN-ST. JEOR: SCORE: 1213.39

## 2019-08-22 NOTE — PROGRESS NOTES
"Subjective     Aye Lazo is a 38 year old female who presents to clinic today for the following health issues:    HPI     Neck, Upper & Lower Back Pain (Work Comp / [employer])  DOI: 03/16/19  LOV: 08/01/2019  Nila reports that her dry needling specialist will be transitioning to [facility] and may not be able to perform routine sessions -- she was given another [facility] therapist that does Dry needling (Alfredo Croft) to fill the absent period. The sessions have been progressively \"less painful\" since the first session. She also has been doing routine home stretches daily and some exercises as able.  Ice and heat help somewhat. She reports that her pain has been on and off (worse with weather) and the tension in her neck and upper back has been slowly improving.      She was given a form by her workplace to allow her to ease her back into work and this has been filled out  Reviewed and updated as needed this visit by provider:  Tobacco  Allergies  Meds  Problems  Med Hx  Surg Hx  Fam Hx         Review of Systems   Constitutional, HEENT, cardiovascular, pulmonary, GI, , musculoskeletal, neuro, skin, endocrine and psych systems are negative, except as otherwise noted.  This document serves as a record of the services and decisions personally performed and made by Tru Archibald MD. It was created on his behalf by Brandon Johnson, a trained medical scribe. The creation of this document is based the provider's statements to the medical scribe.  Scribe Brandon Johnson 10:04 AM, August 22, 2019        Objective   /80   Pulse 97   Temp 98.8  F (37.1  C) (Oral)   Ht 1.626 m (5' 4\")   Wt 55.3 kg (122 lb)   SpO2 98%   BMI 20.94 kg/m   Body mass index is 20.94 kg/m .  Physical Exam vehicle   GENERAL: healthy, alert, well nourished, well hydrated, no distress  HENT: ear canals- normal; TMs- normal; Nose- normal; Mouth- no ulcers, no lesions  NECK: bilateral Suboccipital tenderness, Rotation to about 45 degress bilaterally, " mild-moderate paracervical, trapezius, and rhomboid tenderness, no adenopathy, no asymmetry, no masses, no stiffness; thyroid- normal to palpation  RESP: lungs clear to auscultation - no rales, no rhonchi, no wheezes  CV: regular rates and rhythm, normal S1 S2, no S3 or S4 and no murmur, no click or rub -  ABDOMEN: soft, no tenderness, no  hepatosplenomegaly, no masses, normal bowel sounds  MS: extremities- no gross deformities noted, no edema  SKIN: no suspicious lesions, no rashes  BACK: no CVA tenderness, no paralumbar tenderness now          Assessment & Plan     Aye was seen today for recheck.    Diagnoses and all orders for this visit:    Neck pain /  Stiffness of neck/  Muscle spasms of neck -     Slowly making progress and will try to increase workload and try starting to work.     -     [facility] PT[facility] HAND, AND CHIROPRACTIC REFERRAL; Future  -will continue formal and home therapy with dry needling as this has been beneficial.  She has been doing some chiropractic         See Patient Instructions    Return in about 1 month (around 9/22/2019).          Artem Archibald MD   Pager - [phone]  [facility] [place]

## 2019-09-19 ENCOUNTER — OFFICE VISIT (OUTPATIENT)
Dept: FAMILY MEDICINE | Facility: CLINIC | Age: 39
End: 2019-09-19
Payer: OTHER MISCELLANEOUS

## 2019-09-19 VITALS
WEIGHT: 122 LBS | OXYGEN SATURATION: 98 % | BODY MASS INDEX: 20.83 KG/M2 | DIASTOLIC BLOOD PRESSURE: 80 MMHG | HEIGHT: 64 IN | SYSTOLIC BLOOD PRESSURE: 118 MMHG | HEART RATE: 97 BPM

## 2019-09-19 DIAGNOSIS — M54.2 NECK PAIN: Primary | ICD-10-CM

## 2019-09-19 DIAGNOSIS — M43.6 STIFFNESS OF NECK: ICD-10-CM

## 2019-09-19 DIAGNOSIS — M62.838 MUSCLE SPASMS OF NECK: ICD-10-CM

## 2019-09-19 DIAGNOSIS — M54.6 ACUTE BILATERAL THORACIC BACK PAIN: ICD-10-CM

## 2019-09-19 PROCEDURE — 99213 OFFICE O/P EST LOW 20 MIN: CPT | Performed by: FAMILY MEDICINE

## 2019-09-19 ASSESSMENT — MIFFLIN-ST. JEOR: SCORE: 1213.39

## 2019-09-19 NOTE — LETTER
REPORT OF WORK ABILITY    NOTE TO EMPLOYEE: You must promptly provide a copy of this report to your  employer or worker's compensation insurer, and Qualified Rehabilitation Consultant.    Date: 09/19/2019                 Employee Name: Aye Lazo         YOB: 1980  Medical Record Number: [id number]   Soc.Sec.No: [id number]  Employer: [employer]                Date of Injury: 03/16/2019  Managed Care Organization / Insurance Company Name: Work Comp: [employer]       Diagnosis:     ICD-10-CM    1. Neck pain M54.2    2. Stiffness of neck M43.6    3. Muscle spasms of neck M62.838    4. Acute bilateral thoracic back pain M54.6      Dates of Examination:   03/16/2019, 03/21/2019, 03/28/2019, 04/09/2019, 5/9/19, 6/6/2019, 7/2/19, 8/1/19,8/22/19, and 9/19/19  Work Related: yes     MMI: TBD  Permanent Partial Disability(PPD) likely: TBD     WORK restrictions:   May work with a reduced schedule and workload 4- hours per day- 4-5 days per week.   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     TREATMENT PLAN/NOTES:   - Treatment: physical therapy (continue home exercises) and will continue dry needling and chiropractic,  rest, gentle range of motion exercises, ice or heat.  - Medication:  Occasional ibuprofen as needed  Recheck appointment in 1 month.    Please contact me with questions or concerns.               Artem Archibald M.D.

## 2019-09-19 NOTE — LETTER
REPORT OF WORK ABILITY    NOTE TO EMPLOYEE: You must promptly provide a copy of this report to your  employer or worker's compensation insurer, and Qualified Rehabilitation Consultant.    Date: 09/19/2019                 Employee Name: Aye Lazo         YOB: 1980  Medical Record Number: [id number]   Soc.Sec.No: [id number]  Employer: [employer]                Date of Injury: 03/16/2019  Managed Care Organization / Insurance Company Name: Work Comp: [employer]       Diagnosis:     ICD-10-CM    1. Neck pain M54.2    2. Stiffness of neck M43.6    3. Muscle spasms of neck M62.838    4. Acute bilateral thoracic back pain M54.6      Dates of Examination:   03/16/2019, 03/21/2019, 03/28/2019, 04/09/2019, 5/9/19, 6/6/2019, 7/2/19, 8/1/19,8/22/19, and 9/19/19  Work Related: yes     MMI: TBD  Permanent Partial Disability(PPD) likely: TBD     WORK restrictions:   May work with a reduced schedule and workload 4- hours per day- 4-5 days per week.   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     TREATMENT PLAN/NOTES:   - Treatment: physical therapy (continue home exercises) and will continue dry needling and chiropractic,  rest, gentle range of motion exercises, ice or heat.  - Medication:  Occasional ibuprofen as needed  Please contact me with questions or concerns.               Artem Archibald M.D.

## 2019-09-19 NOTE — PROGRESS NOTES
"Subjective   Aye Lazo is a 38 year old female who presents to clinic today for the following health issues:    HPI   Work comp follow up    Neck, Upper & Lower Back Pain (Work Comp / [employer])  DOI: 03/16/19  LOV 08/22/2019      She still has neck stiffness and pain.  slowly making some gains - seeing chiro [facility].  Has not had a recent dry needling treatment due to less available time now that she is working some.      Pt is working part time- 4 hours/day up to 6 days per week.    No radicular symptoms     Sleep is okay    Reviewed and updated as needed this visit by provider:  Tobacco  Allergies  Meds  Problems  Med Hx  Surg Hx  Fam Hx           Review of Systems   Constitutional, HEENT, cardiovascular, pulmonary, GI, , musculoskeletal, neuro, skin, endocrine and psych systems are negative, except as otherwise noted.      Objective   /80   Pulse 97   Ht 1.626 m (5' 4\")   Wt 55.3 kg (122 lb)   SpO2 98%   BMI 20.94 kg/m   Body mass index is 20.94 kg/m .  Physical Exam   GENERAL: healthy, alert, well nourished, well hydrated, no distress  HENT: ear canals- normal; TMs- normal; Nose- normal; Mouth- no ulcers, no lesions  NECK: bilateral Suboccipital tenderness, Rotation to about 45 degress bilaterally, mild-moderate paracervical, trapezius, and rhomboid tenderness, no adenopathy, no asymmetry, no masses, no stiffness; thyroid- normal to palpation   RESP: lungs clear to auscultation - no rales, no rhonchi, no wheezes  CV: regular rates and rhythm, normal S1 S2, no S3 or S4 and no murmur, no click or rub -  NEURO: strength and tone- normal, sensory exam- grossly normal, mentation- intact, speech- normal, reflexes- symmetric        Assessment & Plan   Aye was seen today for recheck.    Diagnoses and all orders for this visit:    Neck pain    Stiffness of neck    Muscle spasms of neck    Acute bilateral thoracic back pain    ongoing symptoms and will continue therapies and modified work schedule " -       Tobacco Cessation:   reports that she has been smoking.  She has a 9.50 pack-year smoking history. She has never used smokeless tobacco.  Tobacco Cessation Action Plan: Self help information given to patient        See Patient Instructions    Return in about 1 month (around 10/19/2019).          Artem Archibald MD   Pager - [phone]  [facility] [place]

## 2019-10-01 ENCOUNTER — TELEPHONE (OUTPATIENT)
Dept: FAMILY MEDICINE | Facility: CLINIC | Age: 39
End: 2019-10-01

## 2019-10-01 NOTE — TELEPHONE ENCOUNTER
Reason for Call:  Other     Detailed comments: Nila is calling saying she goes to the chiropractor under workmen's comp. She said now workmen's comp won't pay for her chiropractic care until Dr. Archibald sends them a signed physician's prescription. It needs to be post-dated to when he started seeing her. She uses [facility] in [place], [place]. Phone number is [phone]. She would like a call also when this has been sent to them.    Phone Number Patient can be reached at: Cell number on file:    Telephone Information:   Mobile [phone]     Best Time: Anytime    Can we leave a detailed message on this number? YES    Call taken on 10/1/2019 at 1:29 PM by Naomie Bianchi

## 2019-10-09 NOTE — TELEPHONE ENCOUNTER
"I addended the first note from 3/16/19 to include a chiropractic referral -  I had also signed a referral to chiropractic on 4/9/19 as well  These orders can be printed from \"unresulted\" \"other orders\" in chart review.   "

## 2019-10-09 NOTE — TELEPHONE ENCOUNTER
Forms faxed to Concepcion at [facility] at [phone].  Pt contacted and updated.    Roly Benitez RN   [facility]

## 2019-10-24 ENCOUNTER — OFFICE VISIT (OUTPATIENT)
Dept: FAMILY MEDICINE | Facility: CLINIC | Age: 39
End: 2019-10-24
Payer: OTHER MISCELLANEOUS

## 2019-10-24 VITALS
DIASTOLIC BLOOD PRESSURE: 80 MMHG | BODY MASS INDEX: 20.83 KG/M2 | WEIGHT: 122 LBS | HEIGHT: 64 IN | HEART RATE: 105 BPM | TEMPERATURE: 98.4 F | SYSTOLIC BLOOD PRESSURE: 122 MMHG | OXYGEN SATURATION: 97 %

## 2019-10-24 DIAGNOSIS — M54.2 NECK PAIN: Primary | ICD-10-CM

## 2019-10-24 DIAGNOSIS — M43.6 STIFFNESS OF NECK: ICD-10-CM

## 2019-10-24 DIAGNOSIS — M62.838 MUSCLE SPASMS OF NECK: ICD-10-CM

## 2019-10-24 DIAGNOSIS — M54.6 ACUTE BILATERAL THORACIC BACK PAIN: ICD-10-CM

## 2019-10-24 PROCEDURE — 99213 OFFICE O/P EST LOW 20 MIN: CPT | Performed by: FAMILY MEDICINE

## 2019-10-24 ASSESSMENT — MIFFLIN-ST. JEOR: SCORE: 1213.39

## 2019-10-24 NOTE — LETTER
REPORT OF WORK ABILITY    NOTE TO EMPLOYEE: You must promptly provide a copy of this report to your  employer or worker's compensation insurer, and Qualified Rehabilitation Consultant.    Date: 10/24/2019                 Employee Name: Aye Lazo         YOB: 1980  Medical Record Number: [id number]   Soc.Sec.No: [id number]  Employer: [employer]                Date of Injury: 03/16/2019  Managed Care Organization / Insurance Company Name: Work Comp: [employer]       Diagnosis:     ICD-10-CM    1. Neck pain M54.2 PHYSIATRY REFERRAL   2. Stiffness of neck M43.6 PHYSIATRY REFERRAL   3. Muscle spasms of neck M62.838 PHYSIATRY REFERRAL   4. Acute bilateral thoracic back pain M54.6 PHYSIATRY REFERRAL     Dates of Examination:   03/16/2019, 03/21/2019, 03/28/2019, 04/09/2019, 5/9/19, 6/6/2019, 7/2/19, 8/1/19,8/22/19, and 9/19/19, 10/24/2019  Work Related: yes     MMI: TBD  Permanent Partial Disability(PPD) likely: TBD     WORK restrictions:   May work with a reduced schedule and workload 4 hours per day- 4-5 days per week.   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     TREATMENT PLAN/NOTES:   - Treatment: physical therapy (continue home exercises) and will continue dry needling and chiropractic,  rest, gentle range of motion exercises, ice or heat.  Also referred to physiatrist  - Medication:  Occasional ibuprofen as needed  Recheck appointment in 6 weeks.    Please contact me with questions or concerns.               Artem Archibald M.D.

## 2019-10-24 NOTE — PROGRESS NOTES
"Subjective   Aye Lazo is a 38 year old female who presents to clinic today for the following health issues:    HPI     Work comp follow up     Neck, Upper & Lower Back Pain (Work Comp / [employer])  DOI: 03/16/19  LOV 09/19/2019    Getting slightly better every day. She does not get days where it feels loose. She says it is locked. She sees a chiropractic about once a week to every 2 weeks . She adds heat at home. 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 Patient had dry needling done a long time ago and is hesitant to do it again. Muscle relaxants did not do a lot for her. She did PT first and still does the exercises.     Reviewed and updated as needed this visit by provider:  Tobacco  Allergies  Meds  Problems  Med Hx  Surg Hx  Fam Hx       Review of Systems   Constitutional, HEENT, cardiovascular, pulmonary, GI, , musculoskeletal, neuro, skin, endocrine and psych systems are negative, except as otherwise noted.    This document serves as a record of the services and decisions personally performed and made by Tru Archibald MD. It was created on his behalf by Paul Momin, a trained medical scribe. The creation of this document is based the provider's statements to the medical scribe.  Scribe Paul Momin 10:08 AM, October 24, 2019    Objective   /80   Pulse 105   Temp 98.4  F (36.9  C) (Oral)   Ht 1.626 m (5' 4\")   Wt 55.3 kg (122 lb)   SpO2 97%   BMI 20.94 kg/m   Body mass index is 20.94 kg/m .  Physical Exam   GENERAL: healthy, alert, well nourished, well hydrated, no distress  EYES: Eyes grossly normal to inspection, extraocular movements - intact, and PERRL  HENT: ear canals- normal; TMs- normal; Nose- normal; Mouth- no ulcers, no lesions  NECK: no tenderness, no adenopathy, no asymmetry, no masses, no stiffness; thyroid- normal to palpation  RESP: lungs clear to auscultation - no rales, no rhonchi, no wheezes  CV: regular rates and rhythm, normal S1 S2, no S3 " or S4 and no murmur, no click or rub -  ABDOMEN: soft, no tenderness, no  hepatosplenomegaly, no masses, normal bowel sounds  MS: neck tilt is 25 degrees to the right and slightly less to the left, limited in flexion and extension, tenderness in trigger point trapezius and rhomboids tender, left greater than right suboccipital trigger pints are tender too, paracervical muscle tenderness with palpation, otherwise, extremities- no gross deformities noted, no edema  SKIN: no suspicious lesions, no rashes  NEURO: strength and tone- normal, sensory exam- grossly normal, mentation- intact, speech- normal, reflexes- symmetric  BACK: no CVA tenderness, no paralumbar tenderness  PSYCH: Alert and oriented times 3; speech- coherent , normal rate and volume; able to articulate logical thoughts, able to abstract reason, no tangential thoughts, no hallucinations or delusions, affect- normal  LYMPHATICS: ant. cervical- normal, post. cervical- normal, axillary- normal, supraclavicular- normal, inguinal- normal      Assessment & Plan   Aye was seen today for recheck.    Diagnoses and all orders for this visit:    Neck pain / Stiffness of neck / Muscle spasms of neck / Acute bilateral thoracic back pain - Patient is having chronic neck stiffness and upper back and neck pain. Her range of motion is limited. Referral given for physiatry assessment and potential cervical MRI ordered per their recommendation - previous xray normal except loss of neck lordosis. Work letter given today.   -     PHYSIATRY REFERRAL    Tobacco Cessation:   reports that she has been smoking. She has a 9.50 pack-year smoking history. She has never used smokeless tobacco.  Tobacco Cessation Action Plan: Information offered: Patient not interested at this time    See Patient Instructions    Return in about 6 weeks (around 12/5/2019) for recheck.    The information in this document, created by the medical scribe for me, accurately reflects the services I  personally performed and the decisions made by me. I have reviewed and approved this document for accuracy prior to leaving the patient care area.  10:21 AM, 10/24/19        Artem Archibald MD      [address] [address]  [email or web address]   Office: [phone]  Pager: [phone]

## 2019-11-12 ENCOUNTER — TELEPHONE (OUTPATIENT)
Dept: FAMILY MEDICINE | Facility: CLINIC | Age: 39
End: 2019-11-12

## 2019-11-12 NOTE — TELEPHONE ENCOUNTER
Reason for Call:  Other     Detailed comments: The patient is calling saying Dr. Archibald referred her to [facility] physical medicine rehab in [place]. She called there and they couldn't find a location south of the [facility] to do what Dr. Archibald wants her to have done. She is wondering if he would change the referral to a closer place? She would like a call back.    Phone Number Patient can be reached at: Cell number on file:    Telephone Information:   Mobile [phone]     Best Time: Anytime    Can we leave a detailed message on this number? YES    Call taken on 11/12/2019 at 10:19 AM by Naomie Bianchi

## 2019-11-12 NOTE — TELEPHONE ENCOUNTER
Dr Archibald please see message below. Please advise. Thanks    Samantha Solomon  Referral Coordinator

## 2019-11-14 NOTE — TELEPHONE ENCOUNTER
It was for Dry Needling that Kwaku Smith (physicla therapist who was at [facility] and now is at [facility] )  I am okay with a referral to him there if covered by her insurnace

## 2019-11-14 NOTE — TELEPHONE ENCOUNTER
Pt was contacted and advised of referral.   Referral already placed.    Roly Benitez RN   [facility]

## 2019-12-20 NOTE — PROGRESS NOTES
"Subjective   Aye Lazo is a 39 year old female who presents to clinic today for the following health issues:    HPI   Work comp follow up    Neck, Upper & Lower Back Pain (Work Comp / [employer])  DOI: 03/16/19  LOV 10/24/2019    Fall  Started with a fall on ice where she hit her head and immediately felt a shooting pain in her neck.   Not much improvement in the neck. Insurance would not pay for the PT appointments so pt has not been going. She has been working more than the 4 hours due to holiday season which made pain worse. She still gets sharp pain down the left side of the neck and upper back. She is sleeping well. She is not getting headaches. She is not having symptoms in her arms. Her lower back is still sore. She tried dry needling with minimal improvement. She goes to a chiropractic. Warm packs help more than ice.     Reviewed and updated as needed this visit by provider:  Tobacco  Allergies  Meds  Problems  Med Hx  Surg Hx  Fam Hx         Review of Systems   Constitutional, HEENT, cardiovascular, pulmonary, GI, , musculoskeletal, neuro, skin, endocrine and psych systems are negative, except as otherwise noted.    This document serves as a record of the services and decisions personally performed and made by Tru Archibald MD. It was created on his behalf by Paul Momin, a trained medical scribe. The creation of this document is based the provider's statements to the medical scribe.  Ilana Momin 9:33 AM, December 23, 2019    Objective   /80   Pulse 97   Ht 1.626 m (5' 4\")   Wt 55.3 kg (122 lb)   SpO2 97%   BMI 20.94 kg/m   Body mass index is 20.94 kg/m .  Physical Exam   GENERAL: healthy, alert, well nourished, well hydrated, no distress  EYES: Eyes grossly normal to inspection, extraocular movements - intact  NECK: left paracervical muscle tenderness, limited extension with improved flexion, moderate head tilting, limited range of motion in every direction, otherwise, no " adenopathy, no asymmetry, no masses; thyroid- normal to palpation  MS: extremities- no gross deformities noted, no edema  NEURO: strength and tone- normal, sensory exam- grossly normal, mentation- intact, speech- normal, reflexes- symmetric  BACK: no CVA tenderness, no paralumbar tenderness  PSYCH: Alert and oriented times 3; speech- coherent , normal rate and volume; able to articulate logical thoughts, able to abstract reason, no tangential thoughts, no hallucinations or delusions, affect- normal        Assessment & Plan   Aye was seen today for recheck.    Diagnoses and all orders for this visit:    Muscle spasms of neck / Neck pain / Stiffness of neck - minimal improvement. Limited range of motion of neck. Botox injection discussed as a possibility. MRI ordered today. Referral given. Work letter given today.   -     MR Cervical Spine w/o Contrast; Future  -     PAIN MANAGEMENT REFERRAL    See Patient Instructions    Return in about 6 weeks (around 2/3/2020) for Recheck.    The information in this document, created by the medical scribe for me, accurately reflects the services I personally performed and the decisions made by me. I have reviewed and approved this document for accuracy prior to leaving the patient care area.  9:47 AM, 12/23/19        Artem Archibald MD      [address] [address]  [email or web address]   Office: [phone]  Pager: [phone]

## 2019-12-23 ENCOUNTER — TELEPHONE (OUTPATIENT)
Dept: PALLIATIVE MEDICINE | Facility: CLINIC | Age: 39
End: 2019-12-23

## 2019-12-23 ENCOUNTER — OFFICE VISIT (OUTPATIENT)
Dept: FAMILY MEDICINE | Facility: CLINIC | Age: 39
End: 2019-12-23
Payer: OTHER MISCELLANEOUS

## 2019-12-23 VITALS
OXYGEN SATURATION: 97 % | HEART RATE: 97 BPM | BODY MASS INDEX: 20.83 KG/M2 | DIASTOLIC BLOOD PRESSURE: 80 MMHG | WEIGHT: 122 LBS | SYSTOLIC BLOOD PRESSURE: 122 MMHG | HEIGHT: 64 IN

## 2019-12-23 DIAGNOSIS — M43.6 STIFFNESS OF NECK: ICD-10-CM

## 2019-12-23 DIAGNOSIS — M54.2 NECK PAIN: ICD-10-CM

## 2019-12-23 DIAGNOSIS — M62.838 MUSCLE SPASMS OF NECK: Primary | ICD-10-CM

## 2019-12-23 PROCEDURE — 99214 OFFICE O/P EST MOD 30 MIN: CPT | Performed by: FAMILY MEDICINE

## 2019-12-23 ASSESSMENT — MIFFLIN-ST. JEOR: SCORE: 1208.39

## 2019-12-23 NOTE — TELEPHONE ENCOUNTER
LOULOU to schedule Evaluation for procedure.    Padmini GARCIA    [facility]

## 2019-12-23 NOTE — LETTER
REPORT OF WORK ABILITY    NOTE TO EMPLOYEE: You must promptly provide a copy of this report to your  employer or worker's compensation insurer, and Qualified Rehabilitation Consultant.    Date: 10/24/2019                 Employee Name: Aye Lazo         YOB: 1980  Medical Record Number: [id number]   Soc.Sec.No: [id number]  Employer: [employer]                Date of Injury: 03/16/2019  Managed Care Organization / Insurance Company Name: Work Comp: [employer]       Diagnosis:     ICD-10-CM    1. Neck pain M54.2 PHYSIATRY REFERRAL   2. Stiffness of neck M43.6 PHYSIATRY REFERRAL   3. Muscle spasms of neck M62.838 PHYSIATRY REFERRAL   4. Acute bilateral thoracic back pain M54.6 PHYSIATRY REFERRAL     Dates of Examination:   03/16/2019, 03/21/2019, 03/28/2019, 04/09/2019, 5/9/19, 6/6/2019, 7/2/19, 8/1/19,8/22/19, and 9/19/19, 10/24/2019,12/23/19  Work Related: yes     MMI: TBD  Permanent Partial Disability(PPD) likely: TBD     WORK restrictions:   May work with a reduced schedule and workload 4 hours per day- 4-5 days per week.   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     TREATMENT PLAN/NOTES:   - Treatment: pain clinic referral and MRI cervical ordered today, physical therapy (continue home exercises) and chiropractic,  rest, gentle range of motion exercises, ice or heat.  - Medication:  Occasional ibuprofen as needed  Recheck appointment in 6 weeks.    Please contact me with questions or concerns.               Artem Archibald M.D.

## 2019-12-23 NOTE — LETTER
REPORT OF WORK ABILITY    NOTE TO EMPLOYEE: You must promptly provide a copy of this report to your  employer or worker's compensation insurer, and Qualified Rehabilitation Consultant.    Date: 10/24/2019                 Employee Name: Aye Lazo         YOB: 1980  Medical Record Number: [id number]   Soc.Sec.No: [id number]  Employer: [employer]                Date of Injury: 03/16/2019  Managed Care Organization / Insurance Company Name: Work Comp: [employer]       Diagnosis:     ICD-10-CM    1. Muscle spasms of neck M62.838 MR Cervical Spine w/o Contrast     PAIN MANAGEMENT REFERRAL   2. Neck pain M54.2 MR Cervical Spine w/o Contrast     PAIN MANAGEMENT REFERRAL   3. Stiffness of neck M43.6 MR Cervical Spine w/o Contrast     PAIN MANAGEMENT REFERRAL     Dates of Examination:   03/16/2019, 03/21/2019, 03/28/2019, 04/09/2019, 5/9/19, 6/6/2019, 7/2/19, 8/1/19,8/22/19, and 9/19/19, 10/24/2019,12/23/19  Work Related: yes     MMI: TBD  Permanent Partial Disability(PPD) likely: TBD     WORK restrictions:   May work with a reduced schedule and workload 4 hours per day- 4-5 days per week.   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     TREATMENT PLAN/NOTES:   - Treatment: pain clinic referral and MRI cervical ordered today, physical therapy (continue home exercises) and chiropractic,  rest, gentle range of motion exercises, ice or heat.  - Medication:  Occasional ibuprofen as needed  Recheck appointment in 6 weeks.    Please contact me with questions or concerns.               Artem Archibald M.D.

## 2020-01-09 ENCOUNTER — TELEPHONE (OUTPATIENT)
Dept: FAMILY MEDICINE | Facility: CLINIC | Age: 40
End: 2020-01-09

## 2020-01-09 NOTE — TELEPHONE ENCOUNTER
Reason for Call:  Other referral    Detailed comments: Patient calling, re: MR cervical spine; states that she needs a new referral for a location with an open sided MRI as pt is claustrophobic.    Phone Number Patient can be reached at: Home number on file [phone] (home)    Best Time: anytime    Can we leave a detailed message on this number? NO    Call taken on 1/9/2020 at 8:32 AM by Andrew DOSS

## 2020-01-09 NOTE — TELEPHONE ENCOUNTER
[facility] in [place] has open sided MRI.    [facility], [address]  [address] [address]    Scheduling Line: [phone]  Scheduling Fax: [phone]  Clinic Phone: [phone]  Clinic Fax: [phone]          Patient notified by phone. She will call to schedule.  I also advised her to check with     Orders faxed to [facility] [place] at [phone].    Patient verbalized understanding and agreed with plan.      JASON Duval, RN, N  [facility]  Office: [phone]  Fax: [phone]

## 2020-02-06 ENCOUNTER — TELEPHONE (OUTPATIENT)
Dept: FAMILY MEDICINE | Facility: CLINIC | Age: 40
End: 2020-02-06

## 2020-02-06 NOTE — TELEPHONE ENCOUNTER
Reason for Call:  Other     Detailed comments: Nila is calling saying Dr. Archbiald wanted her to have an MRI done. She needed this pre-authorized with her workmen's comp. They denied the request for an MRI. Nila is wondering what her next steps are. She would like a call back.    Phone Number Patient can be reached at: Cell number on file:    Telephone Information:   Mobile [phone]     Best Time: Anytime    Can we leave a detailed message on this number? YES    Call taken on 2/6/2020 at 10:34 AM by Naomie Bianchi

## 2020-02-06 NOTE — TELEPHONE ENCOUNTER
I spoke with patient.  She said she did get a denial letter for MRI.  She will review letter to see if decision can be appealed.  Waiting call back from patient.      JASON Duval, RN, PHN  [facility]  Office: [phone]  Fax: [phone]

## 2020-02-17 ENCOUNTER — TELEPHONE (OUTPATIENT)
Dept: FAMILY MEDICINE | Facility: CLINIC | Age: 40
End: 2020-02-17

## 2020-02-17 NOTE — TELEPHONE ENCOUNTER
Pt calling to report she had dropped of a piece of paper in regards to the denial of MRI. Pt is wondering if this has been addressed by PCP. Writer could not find any documentation of letter being received.    Writer will attempt to locate letter.    Roly Benitez RN   [facility]

## 2020-03-02 NOTE — TELEPHONE ENCOUNTER
Reason for Call:  Other Paper work    Detailed comments: Pt dropped off paper for Dr Archibald to fill out and give her his opinion for her work (it is re MRI), pt is stating she droop off second time paper on 2/27/20 and did not hear anything from , she needs to know his opinion today because her work is waiting to have info from Dr Archibald.    Phone Number Patient can be reached at: Home number on file [phone] (home)    Best Time: anytime    Can we leave a detailed message on this number? YES    Call taken on 3/2/2020 at 9:45 AM by Dorinda Mcintyre

## 2020-03-03 NOTE — TELEPHONE ENCOUNTER
Reason for Call:  Other     Detailed comments: Nila is calling saying she is extremely frustrated that she hasn't heard anything back yet on her paperwork. She is wondering if he can write those dates or if she has to come in again. She said she is really sick of this going back and forth. She said she needed to hear back yesterday and never did.    Phone Number Patient can be reached at: Cell number on file:    Telephone Information:   Mobile [phone]     Best Time: Anytime    Can we leave a detailed message on this number? YES    Call taken on 3/3/2020 at 10:23 AM by Naomie Bianchi

## 2020-03-04 ENCOUNTER — TELEPHONE (OUTPATIENT)
Dept: FAMILY MEDICINE | Facility: CLINIC | Age: 40
End: 2020-03-04

## 2020-03-04 NOTE — TELEPHONE ENCOUNTER
LOV: 12/23/2019  OV Notes:   Muscle spasms of neck / Neck pain / Stiffness of neck - minimal improvement. Limited range of motion of neck. Botox injection discussed as a possibility. MRI ordered today. Referral given. Work letter given today.   -     MR Cervical Spine w/o Contrast; Future  -     PAIN MANAGEMENT REFERRAL    Does patient have any new/different work restrictions than last letter?  Per Last Letter:   WORK restrictions:   May work with a reduced schedule and workload 4 hours per day- 4-5 days per week.   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     Called patient @ # below -   Patient stated work restrictions are the exact same as last letter.   Letter can be faxed to [phone] Attn: Vera    Routing to PCP for further review/recommendations/orders.    Ashley Ayoub RN  [facility]

## 2020-03-04 NOTE — TELEPHONE ENCOUNTER
Reason for Call:  Other call back    Detailed comments: Patient is calling because she says she received a message from Dr Archibald saying he never ordered an MRI for her, and in fact he did, she needs a letter stating he did order the MRI or else work comp will not cover it. Also she needs a new order for her job that says she still has restrictions for work . Patient says she needs to hear back from us today, this is her 5th phone call. Thanks     Phone Number Patient can be reached at: Cell number on file:    Telephone Information:   Mobile [phone]       Best Time: any     Can we leave a detailed message on this number? YES    Call taken on 3/4/2020 at 8:45 AM by Laverne Dale

## 2020-03-06 NOTE — TELEPHONE ENCOUNTER
I  Called her at ~ 5:20pm and letter was written - please print and fax to her work.  I am working on getting the MRI approved and she plans to call the pain clinic to see if something can be done to improve her symptoms.

## 2020-03-06 NOTE — TELEPHONE ENCOUNTER
Letter faxed, attn: Vera to [phone].    JASON Duval, RN, PHN  [facility]  Office: [phone]  Fax: [phone]

## 2020-03-10 ENCOUNTER — HEALTH MAINTENANCE LETTER (OUTPATIENT)
Age: 40
End: 2020-03-10

## 2020-04-30 ENCOUNTER — TELEPHONE (OUTPATIENT)
Dept: FAMILY MEDICINE | Facility: CLINIC | Age: 40
End: 2020-04-30

## 2020-04-30 NOTE — TELEPHONE ENCOUNTER
Routing to provider pool.  See 3/5/2020 work letter.    Okay to write similar letter or do you want video visit?    JASON Duval, RN, PHN  [facility]  Office: [phone]  Fax: [phone]

## 2020-04-30 NOTE — TELEPHONE ENCOUNTER
Reason for Call:  Other call back/ Workability form    Detailed comments: Pt called this morning and would like Dr. Archibald to put in a workability form to her employer ([employer]). The pt is asking to keep the same restrictions. Please give pt a call if there are any questions--- however, pt would like a confirmation call once it has been sent. Pt's employer fax number is: [phone]. Thank you.    Phone Number Patient can be reached at: Home number on file [phone] (home)    Best Time:     Can we leave a detailed message on this number? YES    Call taken on 4/30/2020 at 8:28 AM by Brigette Barkley

## 2020-04-30 NOTE — TELEPHONE ENCOUNTER
Please assist with scheduling per LP note below.      JASON Duval, RN, PHN  [facility]  Office: [phone]  Fax: [phone]

## 2020-04-30 NOTE — TELEPHONE ENCOUNTER
Pt has not had a visit since 12/2019 - needs video visit next week with RL to discuss/rewrite restrictions if appropriate.      Sarah Ross MBA, MS, PA-C

## 2020-05-05 ENCOUNTER — VIRTUAL VISIT (OUTPATIENT)
Dept: FAMILY MEDICINE | Facility: CLINIC | Age: 40
End: 2020-05-05
Payer: OTHER MISCELLANEOUS

## 2020-05-05 DIAGNOSIS — M54.2 NECK PAIN: Primary | ICD-10-CM

## 2020-05-05 DIAGNOSIS — M62.838 MUSCLE SPASMS OF NECK: ICD-10-CM

## 2020-05-05 PROCEDURE — 99213 OFFICE O/P EST LOW 20 MIN: CPT | Mod: GT | Performed by: FAMILY MEDICINE

## 2020-05-05 NOTE — PROGRESS NOTES
"Subjective   Aye Lazo is a 39 year old female who is being evaluated via a billable video visit.      The patient has been notified of following:     \"This video visit will be conducted via a call between you and your physician/provider. We have found that certain health care needs can be provided without the need for an in-person physical exam.  This service lets us provide the care you need with a video conversation.  If a prescription is necessary we can send it directly to your pharmacy.  If lab work is needed we can place an order for that and you can then stop by our lab to have the test done at a later time.    Video visits are billed at different rates depending on your insurance coverage.  Please reach out to your insurance provider with any questions.    If during the course of the call the physician/provider feels a video visit is not appropriate, you will not be charged for this service.\"     Patient has given verbal consent for Video visit? 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  She is unable to deliver from the truck as she is unable to rotate the neck well enough.  She has had increased neck pains after the change. She has been doing chiropractic treatment     Reviewed and updated as needed this visit by Provider  Tobacco  Allergies  Meds  Problems  Med Hx  Surg Hx  Fam Hx         ROS: Constitutional, HEENT, cardiovascular, pulmonary, GI, , musculoskeletal, neuro, skin, endocrine and psych systems are negative, except as otherwise noted.       Objective   Reported vitals:  " There were no vitals taken for this visit.   GENERAL: healthy, alert and no distress  EYES: Eyes grossly normal to inspection, conjunctivae and sclerae normal  NECK: reduced rotational and flexion and extension range of motion RESP: no audible wheeze, cough, or visible cyanosis.  No visible retractions or increased work of breathing.  Able to speak fully in complete sentences.  NEURO: Cranial nerves grossly intact, mentation intact and speech normal  PSYCH: mentation appears normal, affect normal/bright, judgement and insight intact, normal speech and appearance well-groomed    Diagnostic Test Results:  Labs reviewed in Epic        Assessment/Plan:  Aye was seen today for patient request for note/letter.    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     Return in about 1 month (around 6/5/2020) for recheck.    Video-Visit Details    Type of service:  Video Visit    Video End Time (time video stopped): 1:53PM    Originating Location (pt. Location): Home    Distant Location (provider location):  [facility]     Mode of Communication:  Video Conference via G-CON    Will email the Report of Work Ability to: [email or web address]  FAX: [phone]      Artem Archibald MD     [address] [address]  [email or web address]   Office: [phone]  Pager: [phone]

## 2020-05-05 NOTE — LETTER
REPORT OF WORK ABILITY    NOTE TO EMPLOYEE: You must promptly provide a copy of this report to your  employer or worker's compensation insurer, and Qualified Rehabilitation Consultant.    Date: 05/05/2020                 Employee Name: Aye Lazo         YOB: 1980  Medical Record Number: [id number]   Soc.Sec.No: [id number]  Employer: [employer]                Date of Injury: 03/16/2019  Managed Care Organization / Insurance Company Name: Work Comp: [employer]       Diagnosis:     ICD-10-CM    1. Neck pain  M54.2    2. Muscle spasms of neck  M62.838      Dates of Examination:   03/16/2019, 03/21/2019, 03/28/2019, 04/09/2019, 5/9/19, 6/6/2019, 7/2/19, 8/1/19,8/22/19, and 9/19/19, 10/24/2019,12/23/19, 05/05/20  Work Related: yes     MMI: TBD  Permanent Partial Disability(PPD) likely: TBD     WORK restrictions:   May work with a reduced schedule and workload 4-5 hours per day- 4-5 days per week.   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     TREATMENT PLAN/NOTES:   - Treatment: Physical therapy (continue home exercises) and chiropractic, rest, gentle range of motion exercises, ice or heat.  - Medication:  Occasional ibuprofen as needed  Recheck appointment in 4 weeks.    Please contact me with questions or concerns.         Artem Archibald MD

## 2020-06-02 ENCOUNTER — VIRTUAL VISIT (OUTPATIENT)
Dept: FAMILY MEDICINE | Facility: CLINIC | Age: 40
End: 2020-06-02
Payer: COMMERCIAL

## 2020-06-02 DIAGNOSIS — M54.50 ACUTE BILATERAL LOW BACK PAIN WITHOUT SCIATICA: ICD-10-CM

## 2020-06-02 DIAGNOSIS — M54.2 NECK PAIN: Primary | ICD-10-CM

## 2020-06-02 DIAGNOSIS — M43.6 STIFFNESS OF NECK: ICD-10-CM

## 2020-06-02 DIAGNOSIS — M62.838 MUSCLE SPASMS OF NECK: ICD-10-CM

## 2020-06-02 PROCEDURE — 99214 OFFICE O/P EST MOD 30 MIN: CPT | Mod: TEL | Performed by: FAMILY MEDICINE

## 2020-06-02 NOTE — PROGRESS NOTES
"Subjective   Aye Lazo is a 39 year old female who is being evaluated via a billable telephone visit.      The patient has been notified of following:     \"This telephone visit will be conducted via a call between you and your physician/provider. We have found that certain health care needs can be provided without the need for a physical exam.  This service lets us provide the care you need with a short phone conversation.  If a prescription is necessary we can send it directly to your pharmacy.  If lab work is needed we can place an order for that and you can then stop by our lab to have the test done at a later time.    Telephone visits are billed at different rates depending on your insurance coverage. During this emergency period, for some insurers they may be billed the same as an in-person visit.  Please reach out to your insurance provider with any questions.    If during the course of the call the physician/provider feels a telephone visit is not appropriate, you will not be charged for this service.\"     Patient has given verbal consent for Telephone visit?  Yes    How would you like to obtain your AVS? Pt will  at  with form    Aye Lazo is a 39 year old female who presents today for the following health issues:    Chief Complaint  Patient presents with:  Forms       Work forms need to be completed    Work comp follow up     Neck, Upper & Lower Back Pain (Work Comp / [employer])  DOI: 03/16/19  Last office visit  05/05/2020 -      About 6 weeks she was moved form a [employer] van to a LLV (traditional [employer] van) which is very boxy which causes visibility issues.  She also does not have good visibility due to her lack of full rotational range of motion of her neck.  The range of motion has worsened since the switch of her vehicles recently.   The symptoms continue into the evening.  I addition her lower back is more painful and she will feel pain shoot up form her low back to the neck. "     She has been doing chiropractic to help cope.    Reviewed and updated as needed this visit by Provider  Tobacco  Allergies  Meds  Problems  Med Hx  Surg Hx  Fam Hx         ROS: Constitutional, HEENT, cardiovascular, pulmonary, GI, , musculoskeletal, neuro, skin, endocrine and psych systems are negative, except as otherwise noted.       Objective   Reported vitals:  There were no vitals taken for this visit.   Gen: healthy, alert, and no distress  Psych: Alert and oriented times 3; coherent speech, normal   rate and volume, able to articulate logical thoughts, able   to abstract reason, no tangential thoughts, no hallucinations   or delusions.  Her affect is normal.          Assessment/Plan:  Aye was seen today for forms.    Diagnoses and all orders for this visit:    Neck pain    Muscle spasms of neck    Stiffness of neck    Acute bilateral low back pain without sciatica    -ongoing symptoms and worsened when she had to switch to a vehicle with poor visibility about 6 weeks ago - She would like to get back into a van that has normal windows and mirrors so she does not have to aggravate her neck pain and stiffness with repetitive twisting of the neck.   See Report of Work Ability too    Return in about 1 month (around 7/2/2020) for recheck.    Phone call duration:  22 minutes        Artem Archibald MD     [address] [address]  [email or web address]   Office: [phone]  Pager: [phone]

## 2020-06-02 NOTE — LETTER
My Depression Action Plan  Name: Aye Lazo   Date of Birth 1980  Date: 6/2/2020    My doctor: Tru Archibald   My clinic: [address] [id number] [phone]          GREEN    ZONE   Good Control    What it looks like:     Things are going generally well. You have normal ups and downs. You may even feel depressed from time to time, but bad moods usually last less than a day.   What you need to do:  1. Continue to care for yourself (see self care plan)  2. Check your depression survival kit and update it as needed  3. Follow your physician s recommendations including any medication.  4. Do not stop taking medication unless you consult with your physician first.           YELLOW         ZONE Getting Worse    What it looks like:     Depression is starting to interfere with your life.     It may be hard to get out of bed; you may be starting to isolate yourself from others.    Symptoms of depression are starting to last most all day and this has happened for several days.     You may have suicidal thoughts but they are not constant.   What you need to do:     1. Call your care team. Your response to treatment will improve if you keep your care team informed of your progress. Yellow periods are signs an adjustment may need to be made.     2. Continue your self-care.  Just get dressed and ready for the day.  Don't give yourself time to talk yourself out of it.    3. Talk to someone in your support network.    4. Open up your Depression Self-Care Plan/Wellness Kit.           RED    ZONE Medical Alert - Get Help    What it looks like:     Depression is seriously interfering with your life.     You may experience these or other symptoms: You can t get out of bed most days, can t work or engage in other necessary activities, you have trouble taking care of basic hygiene, or basic responsibilities, thoughts of suicide or death that will not  go away, self-injurious behavior.     What you need to do:  1. Call your care team and request a same-day appointment. If they are not available (weekends or after hours) call your local crisis line, emergency room or 911.            Depression Self-Care Plan / Wellness Kit    Self-Care for Depression  Here s the deal. Your body and mind are really not as separate as most people think.  What you do and think affects how you feel and how you feel influences what you do and think. This means if you do things that people who feel good do, it will help you feel better.  Sometimes this is all it takes.  There is also a place for medication and therapy depending on how severe your depression is, so be sure to consult with your medical provider and/ or Behavioral Health Consultant if your symptoms are worsening or not improving.     In order to better manage my stress, I will:    Exercise  Get some form of exercise, every day. This will help reduce pain and release endorphins, the  feel good  chemicals in your brain. This is almost as good as taking antidepressants!  This is not the same as joining a gym and then never going! (they count on that by the way ) It can be as simple as just going for a walk or doing some gardening, anything that will get you moving.      Hygiene   Maintain good hygiene (get out of bed in the morning, make your bed, brush your teeth, take a shower, and get dressed like you were going to work, even if you are unemployed).  If your clothes don't fit try to get ones that do.    Diet  Strive to eat foods that are good for me, drink plenty of water, and avoid excessive sugar, caffeine, alcohol, and other mood-altering substances.  Some foods that are helpful in depression are: complex carbohydrates, B vitamins, flaxseed, fish or fish oil, fresh fruits and vegetables.    Psychotherapy  Agree to participate in Individual Therapy (if recommended).    Medication  If prescribed medications, I agree to  take them.  Missing doses can result in serious side effects.  I understand that drinking alcohol, or other illicit drug use, may cause potential side effects.  I will not stop my medication abruptly without first discussing it with my provider.    Staying Connected With Others  Stay in touch with my friends, family members, and my primary care provider/team.    Use your imagination  Be creative.  We all have a creative side; it doesn t matter if it s oil painting, sand castles, or mud pies! This will also kick up the endorphins.    Witness Beauty  (AKA stop and smell the roses) Take a look outside, even in mid-winter. Notice colors, textures. Watch the squirrels and birds.     Service to others  Be of service to others.  There is always someone else in need.  By helping others we can  get out of ourselves  and remember the really important things.  This also provides opportunities for practicing all the other parts of the program.    Humor  Laugh and be silly!  Adjust your TV habits for less news and crime-drama and more comedy.    Control your stress  Try breathing deep, massage therapy, biofeedback, and meditation. Find time to relax each day.     Crisis Text Line  http://www.crisistextline.org    The Crisis Text Line serves anyone, in any type of crisis, providing access to free, 24/7 support and information via the medium people already use and trust:    Here's how it works:  1.  Text 320-113 from anywhere in the [place], anytime, about any type of crisis.  2.  A live, trained Crisis Counselor receives the text and responds quickly.  3.  The volunteer Crisis Counselor will help you move from a 'hot moment to a cool moment'.    My support system    Clinic Contact:  Phone number:    Contact 1:  Phone number:    Contact 2:  Phone number:    Rastafarian/:  Phone number:    Therapist:  Phone number:    Local crisis center:    Phone number:    Other community support:  Phone number:

## 2020-06-02 NOTE — LETTER
REPORT OF WORK ABILITY    NOTE TO EMPLOYEE: You must promptly provide a copy of this report to your  employer or worker's compensation insurer, and Qualified Rehabilitation Consultant.    Date: 06/02/2020                 Employee Name: Aye Lazo         YOB: 1980  Medical Record Number: [id number]   Soc.Sec.No: [id number]  Employer: [employer]                Date of Injury: 03/16/2019  Managed Care Organization / Insurance Company Name: Work Comp: [employer]       Diagnosis:     ICD-10-CM    1. Neck pain  M54.2    2. Muscle spasms of neck  M62.838    3. Stiffness of neck  M43.6    4. bilateral low back pain without sciatica  M54.5      Dates of Examination:   03/16/2019, 03/21/2019, 03/28/2019, 04/09/2019, 5/9/19, 6/6/2019, 7/2/19, 8/1/19,8/22/19, and 9/19/19, 10/24/2019,12/23/19, 05/05/20, 6/2/2020 (telephone visit)  Work Related: yes     MMI: TBD  Permanent Partial Disability(PPD) likely: TBD     WORK restrictions:   May work with a reduced schedule and workload 4-5 hours per day- 4-5 days per week.   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 Vehicle with good all around visibility    TREATMENT PLAN/NOTES:   - Treatment: Physical therapy (continue home exercises) and chiropractic, rest, gentle range of motion exercises, ice or heat.  - Medication:  Occasional ibuprofen as needed  Recheck appointment in 4 weeks.    Please contact me with questions or concerns.         Artem Archibald MD

## 2020-07-21 ENCOUNTER — VIRTUAL VISIT (OUTPATIENT)
Dept: FAMILY MEDICINE | Facility: CLINIC | Age: 40
End: 2020-07-21
Payer: OTHER MISCELLANEOUS

## 2020-07-21 DIAGNOSIS — M54.50 ACUTE BILATERAL LOW BACK PAIN WITHOUT SCIATICA: ICD-10-CM

## 2020-07-21 DIAGNOSIS — M62.838 MUSCLE SPASMS OF NECK: ICD-10-CM

## 2020-07-21 DIAGNOSIS — M54.2 NECK PAIN: Primary | ICD-10-CM

## 2020-07-21 DIAGNOSIS — M43.6 STIFFNESS OF NECK: ICD-10-CM

## 2020-07-21 PROCEDURE — 99213 OFFICE O/P EST LOW 20 MIN: CPT | Mod: TEL | Performed by: FAMILY MEDICINE

## 2020-07-21 NOTE — PROGRESS NOTES
"Subjective   Aye Lazo is a 39 year old female who is being evaluated via a billable telephone visit.      The patient has been notified of following:     \"This telephone visit will be conducted via a call between you and your physician/provider. We have found that certain health care needs can be provided without the need for a physical exam.  This service lets us provide the care you need with a short phone conversation.  If a prescription is necessary we can send it directly to your pharmacy.  If lab work is needed we can place an order for that and you can then stop by our lab to have the test done at a later time.    Telephone visits are billed at different rates depending on your insurance coverage. During this emergency period, for some insurers they may be billed the same as an in-person visit.  Please reach out to your insurance provider with any questions.    If during the course of the call the physician/provider feels a telephone visit is not appropriate, you will not be charged for this service.\"     Patient has given verbal consent for Telephone visit?  Yes    How would you like to obtain your AVS? 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  which helps better than the truck    Recent flare up symptoms after perhaps sleeping in the wrong position - ~3-4 weeks ago - working with chiropractor.    No radicular symptoms.    Reviewed and updated as needed this visit by Provider  Tobacco  Allergies  Meds  Problems  Med Hx  Surg Hx  Fam Hx         ROS: Constitutional, HEENT, cardiovascular, pulmonary, GI, , musculoskeletal, neuro, skin, endocrine and " psych systems are negative, except as otherwise noted.       Objective   Reported vitals:  There were no vitals taken for this visit.   Gen: healthy, alert, and no distress  Psych: Alert and oriented times 3; coherent speech, normal   rate and volume, able to articulate logical thoughts, able   to abstract reason, no tangential thoughts, no hallucinations   or delusions.  Her affect is normal.            Assessment/Plan:  Neck pain  Muscle spasms of neck  Stiffness of neck  Acute bilateral low back pain without sciatica  Ongoing symptoms and has been doing better with new vehicle for post to work.  Still does not feel she could work more than 5 hours/day due to worsening symptoms with fatigue.  Has been working with home therapy and doing stretching.  Forms filled out and will recheck in 1 month      Return in about 1 month (around 8/21/2020) for recheck.    Phone call duration:  13 minutes        Artem Archibald MD     [address] [address]  [email or web address]   Office: [phone]  Pager: [phone]

## 2020-07-21 NOTE — LETTER
REPORT OF WORK ABILITY    NOTE TO EMPLOYEE: You must promptly provide a copy of this report to your  employer or worker's compensation insurer, and Qualified Rehabilitation Consultant.    Date: 07/21/2020                 Employee Name: Aye Lazo         YOB: 1980  Medical Record Number: [id number]   Soc.Sec.No: [id number]  Employer: [employer]                Date of Injury: 03/16/2019  Managed Care Organization / Insurance Company Name: Work Comp: [employer]       Diagnosis:     ICD-10-CM    1. Neck pain  M54.2    2. Muscle spasms of neck  M62.838    3. Stiffness of neck  M43.6    4. bilateral low back pain without sciatica  M54.5      Dates of Examination:   03/16/2019, 03/21/2019, 03/28/2019, 04/09/2019, 5/9/19, 6/6/2019, 7/2/19, 8/1/19,8/22/19, and 9/19/19, 10/24/2019,12/23/19, 05/05/20, 6/2/2020 (telephone visit), 7/21/20 (telephone visit)  Work Related: yes     MMI: TBD  Permanent Partial Disability(PPD) likely: TBD     WORK restrictions:   May work with a reduced schedule and workload 4-5 hours per day- 4-5 days per week.   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 Vehicle with good all around visibility    TREATMENT PLAN/NOTES:   - Treatment: Physical therapy (continue home exercises) and chiropractic, rest, gentle range of motion exercises, ice or heat.  - Medication:  Occasional ibuprofen as needed  Recheck appointment in 4 weeks.    Please contact me with questions or concerns.         Artem Archibald MD

## 2020-09-08 ENCOUNTER — VIRTUAL VISIT (OUTPATIENT)
Dept: FAMILY MEDICINE | Facility: CLINIC | Age: 40
End: 2020-09-08
Payer: OTHER MISCELLANEOUS

## 2020-09-08 DIAGNOSIS — M54.50 ACUTE BILATERAL LOW BACK PAIN WITHOUT SCIATICA: ICD-10-CM

## 2020-09-08 DIAGNOSIS — M62.838 MUSCLE SPASMS OF NECK: Primary | ICD-10-CM

## 2020-09-08 DIAGNOSIS — M43.6 STIFFNESS OF NECK: ICD-10-CM

## 2020-09-08 DIAGNOSIS — M54.2 NECK PAIN: ICD-10-CM

## 2020-09-08 PROCEDURE — 99213 OFFICE O/P EST LOW 20 MIN: CPT | Mod: 95 | Performed by: FAMILY MEDICINE

## 2020-09-08 NOTE — PROGRESS NOTES
"Subjective   Aye Lazo is a 39 year old female who is being evaluated via a billable telephone visit.      The patient has been notified of following:     \"This telephone visit will be conducted via a call between you and your physician/provider. We have found that certain health care needs can be provided without the need for a physical exam.  This service lets us provide the care you need with a short phone conversation.  If a prescription is necessary we can send it directly to your pharmacy.  If lab work is needed we can place an order for that and you can then stop by our lab to have the test done at a later time.    Telephone visits are billed at different rates depending on your insurance coverage. During this emergency period, for some insurers they may be billed the same as an in-person visit.  Please reach out to your insurance provider with any questions.    If during the course of the call the physician/provider feels a telephone visit is not appropriate, you will not be charged for this service.\"     Patient has given verbal consent for Telephone visit?  Yes    How would you like to obtain your AVS? MyChart    Aye Lazo is a 39 year old female who presents today for the following health issues:    Chief Complaint  Patient presents with:  RECHECK         Neck, Upper & Lower Back Pain (Work Comp / [employer])  DOI: 03/16/19  Mainly neck and back pains -- Last office visit  07/21/2020 - has been doing chiropractic. No change since last visit- hard time turning head still.Van is working well for her.       Reviewed and updated as needed this visit by Provider  Tobacco  Allergies  Meds  Problems  Med Hx  Surg Hx  Fam Hx         ROS: Constitutional, HEENT, cardiovascular, pulmonary, GI, , musculoskeletal, neuro, skin, endocrine and psych systems are negative, except as otherwise noted.       Objective   Reported vitals:  There were no vitals taken for this visit.   Gen: healthy, alert, and no " distress  Psych: Alert and oriented times 3; coherent speech, normal   rate and volume, able to articulate logical thoughts, able   to abstract reason, no tangential thoughts, no hallucinations   or delusions.  Her affect is normal.    Diagnostic Test Results:  Labs reviewed in Epic        Assessment/Plan:  Muscle spasms of neck / Neck pain / Stiffness of neck / Acute bilateral low back pain without sciatica  Ongoing symptoms after work-related fall, continues to work on range of motion exercises at home has done for physical therapy in the past.  Has been seeing chiropractor and pain for the thought of her on funds with some mild relief.  She still frustrated by not being able to get an MRI and further testing to figure out why her symptoms have persisted like they have despite rehabilitation /time      Return in about 1 month (around 10/8/2020) for recheck.    Phone call duration:  11:50 minutes        Artem Archibald MD     [address] [address]  [email or web address]   Office: [phone]  Pager: [phone]

## 2020-09-08 NOTE — LETTER
REPORT OF WORK ABILITY    NOTE TO EMPLOYEE: You must promptly provide a copy of this report to your  employer or worker's compensation insurer, and Qualified Rehabilitation Consultant.    Date: 09/08/2020                 Employee Name: Aye Lazo         YOB: 1980  Medical Record Number: [id number]   Soc.Sec.No: [id number]  Employer: [employer]                Date of Injury: 03/16/2019  Managed Care Organization / Insurance Company Name: Work Comp: [employer]     Fax [phone]    Diagnosis:     ICD-10-CM    1. Neck pain  M54.2    2. Muscle spasms of neck  M62.838    3. Stiffness of neck  M43.6    4. bilateral low back pain without sciatica  M54.5      Dates of Examination:   03/16/2019, 03/21/2019, 03/28/2019, 04/09/2019, 5/9/19, 6/6/2019, 7/2/19, 8/1/19,8/22/19, and 9/19/19, 10/24/2019,12/23/19, 05/05/20, 6/2/2020 (telephone visit), 7/21/20 (telephone visit), 9/8/2020 (telephone visit)  Work Related: yes     MMI: TBD  Permanent Partial Disability(PPD) likely: TBD     WORK restrictions:   May work with a reduced schedule and workload 4-5 hours per day- 4-5 days per week.   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 Vehicle with good all around visibility    TREATMENT PLAN/NOTES:   - Treatment: Physical therapy (continue home exercises) and chiropractic, rest, gentle range of motion exercises, ice or heat.  - Medication:  Occasional ibuprofen as needed  Recheck appointment in 4 weeks.    Please contact me with questions or concerns.         Artem Archibald MD

## 2020-09-10 ENCOUNTER — TELEPHONE (OUTPATIENT)
Dept: FAMILY MEDICINE | Facility: CLINIC | Age: 40
End: 2020-09-10

## 2020-09-10 NOTE — TELEPHONE ENCOUNTER
Forms/Letter Request    Name of form/letter: work ability    Have you been seen for this request: Yes 9/8/2020    Do we have the form/letter: Yes: listed in chart and was going to be faxed to employer. States was not received by supervisor and requesting it be faxed again to [phone]    When is form/letter needed by: asap    How would you like the form/letter returned: Fax    Patient Notified form requests are processed in 3-5 business days:Yes    Okay to leave a detailed message? Yes Cell number on file:    Telephone Information:   Mobile [phone]

## 2020-10-08 ENCOUNTER — VIRTUAL VISIT (OUTPATIENT)
Dept: FAMILY MEDICINE | Facility: CLINIC | Age: 40
End: 2020-10-08
Payer: OTHER MISCELLANEOUS

## 2020-10-08 DIAGNOSIS — M62.838 MUSCLE SPASMS OF NECK: Primary | ICD-10-CM

## 2020-10-08 DIAGNOSIS — M43.6 STIFFNESS OF NECK: ICD-10-CM

## 2020-10-08 DIAGNOSIS — M54.50 ACUTE BILATERAL LOW BACK PAIN WITHOUT SCIATICA: ICD-10-CM

## 2020-10-08 DIAGNOSIS — M54.2 NECK PAIN: ICD-10-CM

## 2020-10-08 PROCEDURE — 99214 OFFICE O/P EST MOD 30 MIN: CPT | Mod: 95 | Performed by: FAMILY MEDICINE

## 2020-10-08 NOTE — PROGRESS NOTES
"Subjective   Aye Lazo is a 39 year old female who is being evaluated via a billable telephone visit.      The patient has been notified of following:     \"This telephone visit will be conducted via a call between you and your physician/provider. We have found that certain health care needs can be provided without the need for a physical exam.  This service lets us provide the care you need with a short phone conversation.  If a prescription is necessary we can send it directly to your pharmacy.  If lab work is needed we can place an order for that and you can then stop by our lab to have the test done at a later time.    Telephone visits are billed at different rates depending on your insurance coverage. During this emergency period, for some insurers they may be billed the same as an in-person visit.  Please reach out to your insurance provider with any questions.    If during the course of the call the physician/provider feels a telephone visit is not appropriate, you will not be charged for this service.\"     Patient has given verbal consent for Telephone visit?  Yes    How would you like to obtain your AVS? MyChart    Aye Lazo is a 39 year old female who presents today for the following health issues:    Chief Complaint  Patient presents with:  RECHECK: work comp     Neck, Upper & Lower Back Pain (Work Comp / [employer])  DOI: 03/16/19  Mainly neck and back pains -- Last office visit 09/08/2020- no improvement - work comp will not pay for PT- pt is paying for chiropractor herself- helps with some temporary relief. Some days are better than others.         Reviewed and updated as needed this visit by Provider  Tobacco  Allergies  Meds  Problems  Med Hx  Surg Hx  Fam Hx          ROS: Constitutional, HEENT, cardiovascular, pulmonary, GI, , musculoskeletal, neuro, skin, endocrine and psych systems are negative, except as otherwise noted.       Objective   Reported vitals:  There were no vitals taken " for this visit.   Gen: healthy, alert, and no distress  Psych: Alert and oriented times 3; coherent speech, normal   rate and volume, able to articulate logical thoughts, able   to abstract reason, no tangential thoughts, no hallucinations   or delusions.  Her affect is normal.        Assessment/Plan:  Muscle spasms of neck / Neck pain / Stiffness of neck /  bilateral low back pain without sciatica  She has ongoing symptoms of neck stiffness, associated spasms of the neck and intermittent back pains.  Current vehicle for work is been useful and will continue with modified activity, see report of workability.  I still support getting an MRI to further understand her neck pathology and reasons for not getting better.      Return in about 1 month (around 11/8/2020) for recheck.    Phone call duration:  10 minutes        Artem Archibald MD     [address] [address]  [email or web address]   Office: [phone]  Pager: [phone]

## 2020-10-08 NOTE — LETTER
REPORT OF WORK ABILITY    NOTE TO EMPLOYEE: You must promptly provide a copy of this report to your  employer or worker's compensation insurer, and Qualified Rehabilitation Consultant.    Date: 10/08/2020                 Employee Name: Aye Lazo         YOB: 1980  Medical Record Number: [id number]     Employer: [employer]                Date of Injury: 03/16/2019  Managed Care Organization / Insurance Company Name: Work Comp: [employer]     Fax [phone]    Diagnosis:     ICD-10-CM    1. Neck pain  M54.2    2. Muscle spasms of neck  M62.838    3. Stiffness of neck  M43.6    4. bilateral low back pain without sciatica  M54.5      Dates of Examination:   03/16/2019, 03/21/2019, 03/28/2019, 04/09/2019, 5/9/19, 6/6/2019, 7/2/19, 8/1/19,8/22/19, and 9/19/19, 10/24/2019,12/23/19, 05/05/20, 6/2/2020 (telephone visit), 7/21/20 (telephone visit), 9/8/2020 (telephone visit), 10/8/2020 (telephone visit)  Work Related: yes     MMI: TBD  Permanent Partial Disability(PPD) likely: TBD     WORK restrictions:   May work with a reduced schedule and workload 4-5 hours per day- 4-5 days per week.   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 Vehicle with good all around visibility    TREATMENT PLAN/NOTES:   - Treatment: Physical therapy (continue home exercises) and chiropractic, rest, gentle range of motion exercises, ice or heat.  - Medication:  Occasional ibuprofen as needed  Recheck appointment in 4-6 weeks.    Please contact me with questions or concerns.         Artem Archibald MD

## 2020-11-09 NOTE — PROGRESS NOTES
"{Provider Assessment And Plan:130380}    Subjective   Aye Lazo is a 40 year old female who presents to clinic today for the following health issues:    HPI     Chief Complaint  Patient presents with:  RECHECK: work comp     Neck, Upper & Lower Back Pain (Work Comp / [employer])  DOI: 03/16/19  Mainly neck and back pains -- Last office visit 10/08/2020- no improvement    Reviewed and updated as needed this visit by provider:                 Review of Systems   Constitutional, HEENT, cardiovascular, pulmonary, GI, , musculoskeletal, neuro, skin, endocrine and psych systems are negative, except as otherwise noted.        Objective   There were no vitals taken for this visit. There is no height or weight on file to calculate BMI.  Physical Exam   {.:[id number]::\"GENERAL: healthy, alert, well nourished, well hydrated, no distress\",\"HENT: ear canals- normal; TMs- normal; Nose- normal; Mouth- no ulcers, no lesions\",\"NECK: no tenderness, no adenopathy, no asymmetry, no masses, no stiffness; thyroid- normal to palpation\",\"RESP: lungs clear to auscultation - no rales, no rhonchi, no wheezes\",\"CV: regular rates and rhythm, normal S1 S2, no S3 or S4 and no murmur, no click or rub -\",\"ABDOMEN: soft, no tenderness, no  hepatosplenomegaly, no masses, normal bowel sounds\"}    {DIAGNOSTIC TEST RESULTS (Optional):430942::\"Diagnostic Test Results\"}      "

## 2020-11-10 ENCOUNTER — VIRTUAL VISIT (OUTPATIENT)
Dept: FAMILY MEDICINE | Facility: CLINIC | Age: 40
End: 2020-11-10
Payer: OTHER MISCELLANEOUS

## 2020-11-10 DIAGNOSIS — M43.6 STIFFNESS OF NECK: ICD-10-CM

## 2020-11-10 DIAGNOSIS — M62.838 MUSCLE SPASMS OF NECK: Primary | ICD-10-CM

## 2020-11-10 DIAGNOSIS — M54.50 ACUTE BILATERAL LOW BACK PAIN WITHOUT SCIATICA: ICD-10-CM

## 2020-11-10 DIAGNOSIS — M54.2 NECK PAIN: ICD-10-CM

## 2020-11-10 PROCEDURE — 99214 OFFICE O/P EST MOD 30 MIN: CPT | Mod: 95 | Performed by: FAMILY MEDICINE

## 2020-11-10 NOTE — PROGRESS NOTES
"Assessment & Plan   Muscle spasms of neck  Stiffness of neck  Neck pain  Acute bilateral low back pain without sciatica  Ongoing neck stiffness associated pain yet I recommend a MRI of the cervical region but declined by her work comp carrier      See you report of workability letter    Return in about 1 month (around 12/10/2020) for symptoms failing to improve or sooner if worsening.      Artem Archibald MD      [address] [address]  [email or web address]   Office: [phone]  Pager: [phone]       Flo Lazo is a 40 year old female who is being evaluated via a billable telephone visit.      Patient has given verbal consent for Telephone visit?  Yes    How would you like to obtain your AVS? MyChart    Aye Lazo is a 40 year old female who presents today for the following health issues:    Chief Complaint  Patient presents with:  RECHECK: work comp     Neck, Upper & Lower Back Pain (Work Comp / [employer])  DOI: 03/16/19  Mainly neck and low back pains -- Last office visit 10/08/2020- no improvement-pt is paying for chiropractor herself-     She is working 4-5 days per week up to 4-5 hours per day.  Longer days than this will cause occasional sharp \"electric\" pains and muscle spasms or aches.  Everything feels tight at times.  Loud noises will trigger her to have spasms at times.     She uses ice or heat at times.     She sleep okay and wakes occasionally when uncomfortable.     Reviewed and updated as needed this visit by Provider  Tobacco  Allergies  Meds  Problems  Med Hx  Surg Hx  Fam Hx          ROS: Constitutional, HEENT, cardiovascular, pulmonary, GI, , musculoskeletal, neuro, skin, endocrine and psych systems are negative, except as otherwise noted.       Objective   Reported vitals:  There were no vitals taken for this visit.   Gen: healthy, alert, and no distress  Psych: Alert and oriented times 3; " coherent speech, normal   rate and volume, able to articulate logical thoughts, able   to abstract reason, no tangential thoughts, no hallucinations   or delusions.  Her affect is normal.    Diagnostic Test Results:  Labs reviewed in Epic    Phone call duration:  9 minutes

## 2020-11-10 NOTE — LETTER
REPORT OF WORK ABILITY    NOTE TO EMPLOYEE: You must promptly provide a copy of this report to your  employer or worker's compensation insurer, and Qualified Rehabilitation Consultant.    Date: 11/10/2020                 Employee Name: Aye Lazo         YOB: 1980  Medical Record Number: [id number]     Employer: [employer]                Date of Injury: 03/16/2019  Managed Care Organization / Insurance Company Name: Work Comp: [employer]     Fax [phone]    Diagnosis:     ICD-10-CM    1. Neck pain  M54.2    2. Muscle spasms of neck  M62.838    3. Stiffness of neck  M43.6    4. bilateral low back pain without sciatica  M54.5      Dates of Examination:   03/16/2019, 03/21/2019, 03/28/2019, 04/09/2019, 5/9/19, 6/6/2019, 7/2/19, 8/1/19,8/22/19, and 9/19/19, 10/24/2019,12/23/19, 05/05/20, 6/2/2020 (telephone visit), 7/21/20 (telephone visit), 9/8/2020 (telephone visit), 10/8/2020 (telephone visit), 11/10/2020 (telephone visit)  Work Related: yes     MMI: TBD  Permanent Partial Disability(PPD) likely: TBD     WORK restrictions:   May work with a reduced schedule and workload 4-5 hours per day- 4-5 days per week.   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 Vehicle with good all around visibility    TREATMENT PLAN/NOTES:   - Treatment: Physical therapy (continue home exercises) and chiropractic, rest, gentle range of motion exercises, ice or heat.  - Medication:  Occasional ibuprofen as needed  Recheck appointment in 4-6 weeks.    Please contact me with questions or concerns.         Artem Archibald MD

## 2020-12-14 NOTE — PROGRESS NOTES
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  Also some pain that radiated from the neck all the way down to her lower back that required rest and chiropractic adjustments to take the edge off.  No radicular symptoms at this point.  She is hoping to have additional work-up done including an MRI which I think is reasonable but unfortunately work comp has denied up to this point.  I will try to reach out to the claims staff to support additional work-up to find a definitive plan as she has plateaued in her improvement.  Letter for work restrictions written.     Tobacco Cessation:   reports that she has been smoking. She has a 9.50 pack-year smoking history. She has never used smokeless tobacco.    Return in about 1 month (around 1/15/2021) for recheck, or, symptoms failing to improve or sooner if worsening.      Artem Archibald MD      [address] [address]  [email or web address]   Office: [phone]       Subjective   Aye Lazo is a 40 year old female who is being evaluated via a billable telephone visit today for the following health issues:      Patient has given verbal consent for Telephone visit?  Yes    How would you like to obtain your AVS? MyChart    Chief Complaint  Patient presents with:  RECHECK     HPI     Neck, Upper & Lower Back Pain (Work Comp / [employer])  DOI: 03/16/19  Pt reports that back and neck pain has gotten worse due to the cold weather. Would like to discuss treatment options and whether an MRI would be helpful.  She Has chiropractic adjustment for the acute worsening today and that has helped some.    Last office visit 11/10/2020     Reviewed and updated as needed this visit by Provider  Tobacco  Allergies   Meds  Problems  Med Hx  Surg Hx  Fam Hx          ROS: Constitutional, HEENT, cardiovascular, pulmonary, GI, , musculoskeletal, neuro, skin, endocrine and psych systems are negative, except as otherwise noted.       Objective   Reported vitals:  There were no vitals taken for this visit.   Gen: healthy, alert, and no distress  Psych: Alert and oriented times 3; coherent speech, normal   rate and volume, able to articulate logical thoughts, able   to abstract reason, no tangential thoughts, no hallucinations   or delusions.  Her affect is normal.      Phone call duration:  8 minutes

## 2020-12-15 ENCOUNTER — VIRTUAL VISIT (OUTPATIENT)
Dept: FAMILY MEDICINE | Facility: CLINIC | Age: 40
End: 2020-12-15
Payer: OTHER MISCELLANEOUS

## 2020-12-15 DIAGNOSIS — M62.838 MUSCLE SPASMS OF NECK: Primary | ICD-10-CM

## 2020-12-15 DIAGNOSIS — M54.2 NECK PAIN: ICD-10-CM

## 2020-12-15 DIAGNOSIS — M54.50 ACUTE BILATERAL LOW BACK PAIN WITHOUT SCIATICA: ICD-10-CM

## 2020-12-15 DIAGNOSIS — M54.6 ACUTE BILATERAL THORACIC BACK PAIN: ICD-10-CM

## 2020-12-15 DIAGNOSIS — M43.6 STIFFNESS OF NECK: ICD-10-CM

## 2020-12-15 PROCEDURE — 99214 OFFICE O/P EST MOD 30 MIN: CPT | Mod: 95 | Performed by: FAMILY MEDICINE

## 2020-12-15 NOTE — LETTER
REPORT OF WORK ABILITY    NOTE TO EMPLOYEE: You must promptly provide a copy of this report to your  employer or worker's compensation insurer, and Qualified Rehabilitation Consultant.    Date: 12/15/2020                 Employee Name: Aye Lazo         YOB: 1980  Medical Record Number: [id number]     Employer: [employer]                Date of Injury: 03/16/2019  Managed Care Organization / Insurance Company Name: Work Comp: [employer]     Fax [phone]    Diagnosis:     ICD-10-CM    1. Neck pain  M54.2    2. Muscle spasms of neck  M62.838    3. Stiffness of neck  M43.6    4. bilateral low back pain without sciatica  M54.5      Dates of Examination:   03/16/2019, 03/21/2019, 03/28/2019, 04/09/2019, 5/9/19, 6/6/2019, 7/2/19, 8/1/19,8/22/19, and 9/19/19, 10/24/2019,12/23/19, 05/05/20, 6/2/2020 (telephone visit), 7/21/20 (telephone visit), 9/8/2020 (telephone visit), 10/8/2020 (telephone visit), 11/10/2020 (telephone visit), 12/15/2020 (telephone visit)  Work Related: yes     MMI: TBD  Permanent Partial Disability(PPD) likely: TBD   WORK restrictions:   May work with a reduced schedule and workload 4-5 hours per day- 4-5 days per week.   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 Vehicle with good all around visibility    TREATMENT PLAN/NOTES:   - Treatment: Physical therapy (continue home exercises) and chiropractic, rest, gentle range of motion exercises, ice or heat.  - Medication:  Occasional ibuprofen as needed  Recheck appointment in 4-6 weeks.    Please contact me with questions or concerns.         Artem Archibald MD

## 2021-04-12 ENCOUNTER — TELEPHONE (OUTPATIENT)
Dept: FAMILY MEDICINE | Facility: CLINIC | Age: 41
End: 2021-04-12

## 2021-04-12 NOTE — TELEPHONE ENCOUNTER
Reason for Call:  Form, our goal is to have forms completed with 72 hours, however, some forms may require a visit or additional information.    Type of letter, form or note:  employer- Union President    Who is the form from?: Patient    Where did the form come from: Patient or family brought in       What clinic location was the form placed at?: [place]    Where the form was placed: Dr Archibald Box/Folder    What number is listed as a contact on the form?: [phone]           Call taken on 4/12/2021 at 11:37 AM by Ryann Miller

## 2021-04-13 NOTE — TELEPHONE ENCOUNTER
"Noted and form sent to scanning.  I called the number on the form thinking I was calling her union rep  and Nila answered and stated \" You have the wrong number\" after I introduced myself and then she hung up.  "

## 2021-04-24 ENCOUNTER — HEALTH MAINTENANCE LETTER (OUTPATIENT)
Age: 41
End: 2021-04-24

## 2021-07-13 PROCEDURE — 88305 TISSUE EXAM BY PATHOLOGIST: CPT | Mod: TC,ORL | Performed by: INTERNAL MEDICINE

## 2021-07-13 PROCEDURE — 88305 TISSUE EXAM BY PATHOLOGIST: CPT | Performed by: PATHOLOGY

## 2021-07-14 ENCOUNTER — LAB REQUISITION (OUTPATIENT)
Dept: LAB | Facility: CLINIC | Age: 41
End: 2021-07-14
Payer: COMMERCIAL

## 2021-07-15 LAB
PATH REPORT.COMMENTS IMP SPEC: NORMAL
PATH REPORT.FINAL DX SPEC: NORMAL
PATH REPORT.GROSS SPEC: NORMAL
PATH REPORT.MICROSCOPIC SPEC OTHER STN: NORMAL
PATH REPORT.RELEVANT HX SPEC: NORMAL
PHOTO IMAGE: NORMAL

## 2021-07-15 PROCEDURE — 88305 TISSUE EXAM BY PATHOLOGIST: CPT | Mod: 26 | Performed by: PATHOLOGY

## 2021-07-25 ENCOUNTER — TELEPHONE (OUTPATIENT)
Dept: NURSING | Facility: CLINIC | Age: 41
End: 2021-07-25

## 2021-07-25 NOTE — TELEPHONE ENCOUNTER
Patient is calling to get her results from her biopsy's done on 7/13/21.  She received a message that she had results in my chart, but was not able to get a code to get in and would like a provider to call her and go over the results so she knows what is going on.  I gave her the information in the report, but it did not say much and was not easy to understand.  Message sent to her primary Dr Hermila Davis.    Patient verbalized understanding and agrees with plan.    Monica Hancock Nurse Triage Advisor 8:49 AM 7/25/2021

## 2021-10-03 ENCOUNTER — HEALTH MAINTENANCE LETTER (OUTPATIENT)
Age: 41
End: 2021-10-03

## 2022-05-15 ENCOUNTER — HEALTH MAINTENANCE LETTER (OUTPATIENT)
Age: 42
End: 2022-05-15

## 2022-09-11 ENCOUNTER — HEALTH MAINTENANCE LETTER (OUTPATIENT)
Age: 42
End: 2022-09-11

## 2023-06-03 ENCOUNTER — HEALTH MAINTENANCE LETTER (OUTPATIENT)
Age: 43
End: 2023-06-03

## 2024-02-18 ENCOUNTER — HEALTH MAINTENANCE LETTER (OUTPATIENT)
Age: 44
End: 2024-02-18